# Patient Record
Sex: FEMALE | Race: BLACK OR AFRICAN AMERICAN | Employment: FULL TIME | URBAN - METROPOLITAN AREA
[De-identification: names, ages, dates, MRNs, and addresses within clinical notes are randomized per-mention and may not be internally consistent; named-entity substitution may affect disease eponyms.]

---

## 2017-02-28 ENCOUNTER — OFFICE VISIT (OUTPATIENT)
Dept: INTERNAL MEDICINE CLINIC | Age: 62
End: 2017-02-28

## 2017-02-28 VITALS
TEMPERATURE: 98.1 F | HEIGHT: 63 IN | RESPIRATION RATE: 20 BRPM | BODY MASS INDEX: 41.64 KG/M2 | WEIGHT: 235 LBS | HEART RATE: 86 BPM | DIASTOLIC BLOOD PRESSURE: 87 MMHG | SYSTOLIC BLOOD PRESSURE: 140 MMHG | OXYGEN SATURATION: 98 %

## 2017-02-28 DIAGNOSIS — J01.00 ACUTE MAXILLARY SINUSITIS, RECURRENCE NOT SPECIFIED: Primary | ICD-10-CM

## 2017-02-28 RX ORDER — CEFDINIR 300 MG/1
300 CAPSULE ORAL 2 TIMES DAILY
Qty: 20 CAP | Refills: 0 | Status: SHIPPED | OUTPATIENT
Start: 2017-02-28 | End: 2017-05-08 | Stop reason: ALTCHOICE

## 2017-02-28 RX ORDER — AZELASTINE 1 MG/ML
1 SPRAY, METERED NASAL 2 TIMES DAILY
Qty: 1 BOTTLE | Refills: 1 | Status: SHIPPED | OUTPATIENT
Start: 2017-02-28

## 2017-02-28 NOTE — PROGRESS NOTES
Reviewed record in preparation for visit and have obtained necessary documentation. Identified pt with two pt identifiers(name and ). Health Maintenance Due   Topic    Hepatitis C Screening     ZOSTER VACCINE AGE 60>     BREAST CANCER SCRN MAMMOGRAM     PAP AKA CERVICAL CYTOLOGY     FOBT Q 1 YEAR AGE 50-75     INFLUENZA AGE 9 TO ADULT          No chief complaint on file. Wt Readings from Last 3 Encounters:   17 235 lb (106.6 kg)   16 244 lb 12.8 oz (111 kg)   16 232 lb (105.2 kg)     Temp Readings from Last 3 Encounters:   17 98.1 °F (36.7 °C) (Oral)   16 99 °F (37.2 °C) (Oral)   12/15/15 98.5 °F (36.9 °C) (Oral)     BP Readings from Last 3 Encounters:   17 140/87   16 110/70   16 118/80     Pulse Readings from Last 3 Encounters:   17 86   16 80   16 86           Learning Assessment:  :     Learning Assessment 2014   PRIMARY LEARNER Patient   PRIMARY LANGUAGE ENGLISH   LEARNER PREFERENCE PRIMARY READING     LISTENING     PICTURES     VIDEOS   ANSWERED BY patient   RELATIONSHIP SELF       Depression Screening:  :     PHQ 2 / 9, over the last two weeks 3/2/2016   Little interest or pleasure in doing things Not at all   Feeling down, depressed or hopeless Not at all   Total Score PHQ 2 0       Fall Risk Assessment:  :     No flowsheet data found. Abuse Screening:  :     Abuse Screening Questionnaire 2014   Do you ever feel afraid of your partner? N   Are you in a relationship with someone who physically or mentally threatens you? N   Is it safe for you to go home?  Y       Coordination of Care Questionnaire:  :     1) Have you been to an emergency room, urgent care clinic since your last visit? no   Hospitalized since your last visit? no             2) Have you seen or consulted any other health care providers outside of [address] [place] [address] since your last visit? no  (Include any pap smears or colon screenings in this section.)    3) Do you have an Advance Directive on file? no    4) Are you interested in receiving information on Advance Directives? YES      Patient is accompanied by self I have received verbal consent from Arcelia Hernadez to discuss any/all medical information while they are present in the room.

## 2017-02-28 NOTE — PATIENT INSTRUCTIONS
Pidefarma Activation    Thank you for requesting access to Pidefarma. Please follow the instructions below to securely access and download your online medical record. Pidefarma allows you to send messages to your doctor, view your test results, renew your prescriptions, schedule appointments, and more. How Do I Sign Up? 1. In your internet browser, go to [email or web address]  2. Click on the First Time User? Click Here link in the Sign In box. You will be redirect to the New Member Sign Up page. 3. Enter your Pidefarma Access Code exactly as it appears below. You will not need to use this code after youve completed the sign-up process. If you do not sign up before the expiration date, you must request a new code. Pidefarma Access Code: [email or web address]  Expires: 2017 10:13 AM (This is the date your Pidefarma access code will )    4. Enter the last four digits of your Social Security Number (xxxx) and Date of Birth (mm/dd/yyyy) as indicated and click Submit. You will be taken to the next sign-up page. 5. Create a Pidefarma ID. This will be your Pidefarma login ID and cannot be changed, so think of one that is secure and easy to remember. 6. Create a Pidefarma password. You can change your password at any time. 7. Enter your Password Reset Question and Answer. This can be used at a later time if you forget your password. 8. Enter your e-mail address. You will receive e-mail notification when new information is available in [address] 9. Click Sign Up. You can now view and download portions of your medical record. 10. Click the Download Summary menu link to download a portable copy of your medical information. Additional Information    If you have questions, please visit the Frequently Asked Questions section of the Pidefarma website at [email or web address] Remember, Pidefarma is NOT to be used for urgent needs. For medical emergencies, dial 911.

## 2017-02-28 NOTE — PROGRESS NOTES
Subjective:   Christy Colon is a 59 y.o. female who complains of congestion, post nasal drip, dry cough, bilateral sinus pain and bilateral ear pain for several days, gradually worsening since that time. She denies a history of fevers, shortness of breath and wheezing. She reports last month she was seen at [facility] for a sinus infection. She was prescribed medication at that time. She did get better but then the symptoms return. Evaluation to date: none. Treatment to date: OTC products. Patient does not smoke cigarettes. Relevant PMH: No pertinent additional PMH. Patient Active Problem List    Diagnosis Date Noted    DVT (deep venous thrombosis) ([facility] [address] 01/26/2015    Rheumatoid arthritis 04/21/2014    HTN (hypertension) 06/18/2012    Arthritis of knee 06/18/2012    Hypercholesterolemia 06/18/2012    Obesity 06/18/2012     No Known Allergies     Review of Systems  Pertinent items are noted in HPI. Objective:     Visit Vitals    /87    Pulse 86    Temp 98.1 °F (36.7 °C) (Oral)    Resp 20    Ht 5' 3\" (1.6 m)    Wt 235 lb (106.6 kg)    SpO2 98%    BMI 41.63 kg/m2     General:  alert, cooperative, no distress   Eyes: negative   Ears: abnormal TM AD - bulging, abnormal TM AS - bulging   Sinuses: tenderness over bilateral maxillary   Mouth:  abnormal findings: mild oropharyngeal erythema   Neck: no adenopathy. Heart: normal rate and regular rhythm, no murmurs noted. Lungs: clear to auscultation bilaterally   Abdomen: Not exmained        Assessment/Plan:   sinusitis  Antibiotics per orders. RTC prn. Ludwin Millererson was seen today for cold symptoms. Diagnoses and all orders for this visit:    Acute maxillary sinusitis, recurrence not specified  -     cefdinir (OMNICEF) 300 mg capsule; Take 1 Cap by mouth two (2) times a day. -     azelastine (ASTELIN) 137 mcg (0.1 %) nasal spray; 1 [place] by Both Nostrils route two (2) times a day. Use in each nostril as directed    . patient to take medication as prescribed. She should follow up if not better.

## 2017-02-28 NOTE — MR AVS SNAPSHOT
Visit Information Date & Time Provider Department Dept. Phone Encounter #  
 2/28/2017 10:10 AM Barbara Escalante PA-C [facility] [phone] [id number] Your Appointments 3/7/2017  4:30 PM  
ROUTINE CARE with Hany Phelps MD  
[facility]) Appt Note: 6 month f/u  
 [place] [address] [facility] [address] Upcoming Health Maintenance Date Due Hepatitis C Screening 1955 ZOSTER VACCINE AGE 60> 8/4/2013 BREAST CANCER SCRN MAMMOGRAM 6/13/2014 PAP AKA CERVICAL CYTOLOGY 6/18/2015 FOBT Q 1 YEAR AGE 50-75 7/7/2015 INFLUENZA AGE 9 TO ADULT 8/1/2016 DTaP/Tdap/Td series (2 - Td) 6/18/2022 Allergies as of 2/28/2017  In Progress On: 2/28/2017 By: Shahid Streeter LPN No Known Allergies Current Immunizations  Reviewed on 7/7/2014 Name Date Influenza Vaccine 10/9/2014 TB Skin Test (PPD) Intradermal 10/9/2014 TDAP Vaccine 6/18/2012  2:07 PM  
  
 Not reviewed this visit You Were Diagnosed With   
  
 Codes Comments Acute maxillary sinusitis, recurrence not specified    -  Primary ICD-10-CM: J01.00 ICD-9-CM: 461.0 Vitals BP  
  
  
  
  
  
 140/87 Vitals History BMI and BSA Data Body Mass Index Body Surface Area  
 41.63 kg/m 2 2.18 m 2 Preferred Pharmacy Pharmacy Name Phone [facility] [address] [place] [address] [phone] Your Updated Medication List  
  
   
This list is accurate as of: 2/28/17 10:40 AM.  Always use your most recent med list.  
  
  
  
  
 azelastine 137 mcg (0.1 %) nasal spray Commonly known as:  ASTELIN  
1 Spray by Both Nostrils route two (2) times a day. Use in each nostril as directed  
  
 cefdinir 300 mg capsule Commonly known as:  OMNICEF  
 Take 1 Cap by mouth two (2) times a day. chlorthalidone 25 mg tablet Commonly known as:  Woodward Gasman Take 0.5 Tabs by mouth daily. folic acid 1 mg tablet Commonly known as:  Google Take 1 Tab by mouth daily. lisinopril 20 mg tablet Commonly known as:  PRINIVIL, ZESTRIL  
TAKE ONE TABLET BY MOUTH ONCE DAILY  
  
 methotrexate 2.5 mg tablet Commonly known as:  RHEUMATREX  
TAKE 5 TABLETS BY MOUTH EVERY   
  
 simvastatin 20 mg tablet Commonly known as:  ZOCOR Take 1 Tab by mouth nightly. SULFAZINE 500 mg tablet Generic drug:  sulfaSALAzine Prescriptions Sent to Pharmacy Refills  
 cefdinir (OMNICEF) 300 mg capsule 0 Sig: Take 1 Cap by mouth two (2) times a day. Class: Normal  
 Pharmacy: [address] [facility], [address] [facility] Ph #: [phone] Route: Oral  
 azelastine (ASTELIN) 137 mcg (0.1 %) nasal spray 1 [place] by Both Nostrils route two (2) times a day. Use in each nostril as directed Class: Normal  
 Pharmacy: [address] [facility] [address], [address] [facility] Ph #: [phone] Route: Both Nostrils Patient Instructions Deck App Technologieshart Activation Thank you for requesting access to Ajungo. Please follow the instructions below to securely access and download your online medical record. Ajungo allows you to send messages to your doctor, view your test results, renew your prescriptions, schedule appointments, and more. How Do I Sign Up? 1. In your internet browser, go to [email or web address] 
2. Click on the First Time User? Click Here link in the Sign In box. You will be redirect to the New Member Sign Up page. 3. Enter your Ajungo Access Code exactly as it appears below. You will not need to use this code after youve completed the sign-up process. If you do not sign up before the expiration date, you must request a new code. Ajungo Access Code: [email or web address] Expires: 2017 10:13 AM (This is the date your SEC Watch access code will ) 4. Enter the last four digits of your Social Security Number (xxxx) and Date of Birth (mm/dd/yyyy) as indicated and click Submit. You will be taken to the next sign-up page. 5. Create a Milk Mantrat ID. This will be your SEC Watch login ID and cannot be changed, so think of one that is secure and easy to remember. 6. Create a SEC Watch password. You can change your password at any time. 7. Enter your Password Reset Question and Answer. This can be used at a later time if you forget your password. 8. Enter your e-mail address. You will receive e-mail notification when new information is available in [address] 9. Click Sign Up. You can now view and download portions of your medical record. 10. Click the Download Summary menu link to download a portable copy of your medical information. Additional Information If you have questions, please visit the Frequently Asked Questions section of the SEC Watch website at [email or web address] "[email or web address][employer] [email or web address] Remember, SEC Watch is NOT to be used for urgent needs. For medical emergencies, dial 911. Introducing [facility] & HEALTH SERVICES! [facility] introduces SEC Watch patient portal. Now you can access parts of your medical record, email your doctor's office, and request medication refills online. 1. In your internet browser, go to [email or web address] "[email or web address][employer] [email or web address] 2. Click on the First Time User? Click Here link in the Sign In box. You will see the New Member Sign Up page. 3. Enter your SEC Watch Access Code exactly as it appears below. You will not need to use this code after youve completed the sign-up process. If you do not sign up before the expiration date, you must request a new code. · SEC Watch Access Code: [email or web address] Expires: 2017 10:13 AM 
 
4.  Enter the last four digits of your Social Security Number (xxxx) and Date of Birth (mm/dd/yyyy) as indicated and click Submit. You will be taken to the next sign-up page. 5. Create a RHLvision Technologies ID. This will be your RHLvision Technologies login ID and cannot be changed, so think of one that is secure and easy to remember. 6. Create a RHLvision Technologies password. You can change your password at any time. 7. Enter your Password Reset Question and Answer. This can be used at a later time if you forget your password. 8. Enter your e-mail address. You will receive e-mail notification when new information is available in [address] 9. Click Sign Up. You can now view and download portions of your medical record. 10. Click the Download Summary menu link to download a portable copy of your medical information. If you have questions, please visit the Frequently Asked Questions section of the RHLvision Technologies website. Remember, RHLvision Technologies is NOT to be used for urgent needs. For medical emergencies, dial 911. Now available from your iPhone and Android! Please provide this summary of care documentation to your next provider. Your primary care clinician is listed as Jessy Messina. If you have any questions after today's visit, please call [phone].

## 2017-03-07 ENCOUNTER — OFFICE VISIT (OUTPATIENT)
Dept: INTERNAL MEDICINE CLINIC | Age: 62
End: 2017-03-07

## 2017-03-07 VITALS
HEIGHT: 63 IN | RESPIRATION RATE: 16 BRPM | OXYGEN SATURATION: 98 % | SYSTOLIC BLOOD PRESSURE: 128 MMHG | WEIGHT: 238 LBS | BODY MASS INDEX: 42.17 KG/M2 | HEART RATE: 104 BPM | DIASTOLIC BLOOD PRESSURE: 84 MMHG

## 2017-03-07 DIAGNOSIS — I10 ESSENTIAL HYPERTENSION WITH GOAL BLOOD PRESSURE LESS THAN 130/80: Primary | ICD-10-CM

## 2017-03-07 DIAGNOSIS — E78.00 HYPERCHOLESTEROLEMIA: ICD-10-CM

## 2017-03-07 DIAGNOSIS — J01.00 ACUTE MAXILLARY SINUSITIS, RECURRENCE NOT SPECIFIED: ICD-10-CM

## 2017-03-07 RX ORDER — SIMVASTATIN 20 MG/1
20 TABLET, FILM COATED ORAL
Qty: 90 TAB | Refills: 3 | Status: SHIPPED | OUTPATIENT
Start: 2017-03-07 | End: 2018-03-13 | Stop reason: SDUPTHER

## 2017-03-07 RX ORDER — LISINOPRIL 20 MG/1
TABLET ORAL
Qty: 90 TAB | Refills: 3 | Status: SHIPPED | OUTPATIENT
Start: 2017-03-07 | End: 2018-03-13 | Stop reason: SDUPTHER

## 2017-03-07 NOTE — MR AVS SNAPSHOT
Visit Information Date & Time Provider Department Dept. Phone Encounter #  
 3/7/2017  4:30 PM Nancy Man, [address] [facility] [phone] [id number] Upcoming Health Maintenance Date Due Hepatitis C Screening 1955 ZOSTER VACCINE AGE 60> 8/4/2013 BREAST CANCER SCRN MAMMOGRAM 6/13/2014 PAP AKA CERVICAL CYTOLOGY 6/18/2015 FOBT Q 1 YEAR AGE 50-75 7/7/2015 INFLUENZA AGE 9 TO ADULT 8/1/2016 DTaP/Tdap/Td series (2 - Td) 6/18/2022 Allergies as of 3/7/2017  Review Complete On: 3/7/2017 By: Waqar Wood LPN No Known Allergies Current Immunizations  Reviewed on 7/7/2014 Name Date Influenza Vaccine 10/9/2014 TB Skin Test (PPD) Intradermal 10/9/2014 TDAP Vaccine 6/18/2012  2:07 PM  
  
 Not reviewed this visit You Were Diagnosed With   
  
 Codes Comments Hypercholesterolemia     ICD-10-CM: E78.00 ICD-9-CM: 272.0 Vitals BP Pulse Resp Height(growth percentile) Weight(growth percentile) SpO2  
 128/84 (!) 104 16 5' 3\" (1.6 m) 238 lb (108 kg) 98% BMI OB Status Smoking Status 42.16 kg/m2 Postmenopausal Never Smoker Vitals History BMI and BSA Data Body Mass Index Body Surface Area  
 42.16 kg/m 2 2.19 m 2 Preferred Pharmacy Pharmacy Name Phone [facility] [address] [place] [address] [phone] Your Updated Medication List  
  
   
This list is accurate as of: 3/7/17  5:14 PM.  Always use your most recent med list.  
  
  
  
  
 azelastine 137 mcg (0.1 %) nasal spray Commonly known as:  ASTELIN  
1 Spray by Both Nostrils route two (2) times a day. Use in each nostril as directed  
  
 cefdinir 300 mg capsule Commonly known as:  OMNICEF Take 1 Cap by mouth two (2) times a day. chlorthalidone 25 mg tablet Commonly known as:  Wilhemena Nickels Take 0.5 Tabs by mouth daily. folic acid 1 mg tablet Commonly known as:  Google  
 Take 1 Tab by mouth daily. lisinopril 20 mg tablet Commonly known as:  PRINIVIL, ZESTRIL  
TAKE ONE TABLET BY MOUTH ONCE DAILY  
  
 methotrexate 2.5 mg tablet Commonly known as:  RHEUMATREX  
TAKE 5 TABLETS BY MOUTH EVERY SUNDAY  
  
 simvastatin 20 mg tablet Commonly known as:  ZOCOR Take 1 Tab by mouth nightly. SULFAZINE 500 mg tablet Generic drug:  sulfaSALAzine Prescriptions Sent to Pharmacy Refills  
 lisinopril (PRINIVIL, ZESTRIL) 20 mg tablet 3 Sig: TAKE ONE TABLET BY MOUTH ONCE DAILY Class: Normal  
 Pharmacy: [address] Ph #: [phone]  
 simvastatin (ZOCOR) 20 mg tablet 3 Sig: Take 1 Tab by mouth nightly. Class: Normal  
 Pharmacy: [address] [facility], [address] [facility] Maddie Velásquez Ph #: [phone] Route: Oral  
  
Introducing [facility] & HEALTH SERVICES! Vee Mathews introduces Scorista.ru patient portal. Now you can access parts of your medical record, email your doctor's office, and request medication refills online. 1. In your internet browser, go to [email or web address] 2. Click on the First Time User? Click Here link in the Sign In box. You will see the New Member Sign Up page. 3. Enter your Scorista.ru Access Code exactly as it appears below. You will not need to use this code after youve completed the sign-up process. If you do not sign up before the expiration date, you must request a new code. · [email or web address].ru Access Code: [email or web address] Expires: 5/29/2017 10:13 AM 
 
4. Enter the last four digits of your Social Security Number (xxxx) and Date of Birth (mm/dd/yyyy) as indicated and click Submit. You will be taken to the next sign-up page. 5. Create a Cybrata Networkst ID. This will be your Scorista.ru login ID and cannot be changed, so think of one that is secure and easy to remember. 6. Create a Cybrata Networkst password. You can change your password at any time. 7. Enter your Password Reset Question and Answer. This can be used at a later time if you forget your password. 8. Enter your e-mail address. You will receive e-mail notification when new information is available in [address] 9. Click Sign Up. You can now view and download portions of your medical record. 10. Click the Download Summary menu link to download a portable copy of your medical information. If you have questions, please visit the Frequently Asked Questions section of the GameOn website. Remember, GameOn is NOT to be used for urgent needs. For medical emergencies, dial 911. Now available from your iPhone and Android! Please provide this summary of care documentation to your next provider. Your primary care clinician is listed as Charlette Tidwell. If you have any questions after today's visit, please call [phone].

## 2017-05-08 ENCOUNTER — PATIENT OUTREACH (OUTPATIENT)
Dept: INTERNAL MEDICINE CLINIC | Age: 62
End: 2017-05-08

## 2017-05-08 DIAGNOSIS — I10 ESSENTIAL HYPERTENSION WITH GOAL BLOOD PRESSURE LESS THAN 130/80: ICD-10-CM

## 2017-05-08 RX ORDER — DEXAMETHASONE 2 MG/1
4 TABLET ORAL 2 TIMES DAILY WITH MEALS
COMMUNITY
End: 2017-06-07 | Stop reason: DRUGHIGH

## 2017-05-08 RX ORDER — LEFLUNOMIDE 20 MG/1
20 TABLET ORAL DAILY
COMMUNITY

## 2017-05-08 RX ORDER — METHOTREXATE 2.5 MG/1
20 TABLET ORAL
COMMUNITY

## 2017-05-08 RX ORDER — CHLORTHALIDONE 25 MG/1
12.5 TABLET ORAL DAILY
Qty: 45 TAB | Refills: 3 | Status: SHIPPED | OUTPATIENT
Start: 2017-05-08 | End: 2018-03-13 | Stop reason: SDUPTHER

## 2017-05-08 NOTE — PROGRESS NOTES
This note will not be viewable in [address] Patient listed on [facility] inpatient census for admission to [address] [place] [address] - for c/o HA with photophobia and diagnosed with intracranial mass, most likely meningioma, on CT of head. Instructed to f/u with neurosurgeon to schedule surgery next week. Discharged on Decadron. Contacted patient for DEE DEE follow up. Introduced self and Nurse Navigator role. Verified patient's . Reports she is doing okay and denies any pain at present. Reports prior to admission she had been experiencing headaches and Friday night her pain would not subside after lying down. Reports the tumor is a little over 3 cm and when they pressed on the brain it caused swelling. She was instructed to schedule the surgery ASAP. She has placed a call to Dr. Sohail Simon office and is awaiting a return call. Reviewed all medications with patient reading from pill bottles and updated Med Rec. She requested a refill on chlorthalidone, which has been routed to Dr. Constance Cameron. She would like NN to contact her after discharge from upcoming surgery and denies any further needs at this time.

## 2017-05-30 ENCOUNTER — PATIENT OUTREACH (OUTPATIENT)
Dept: INTERNAL MEDICINE CLINIC | Age: 62
End: 2017-05-30

## 2017-05-30 NOTE — PROGRESS NOTES
Patient is listed on daily census for scheduled admission to [address] [place] [address] 5/30 for intracranial mass. Plan to follow up with patient after discharge.

## 2017-06-07 ENCOUNTER — PATIENT OUTREACH (OUTPATIENT)
Dept: INTERNAL MEDICINE CLINIC | Age: 62
End: 2017-06-07

## 2017-06-07 RX ORDER — IBUPROFEN 600 MG/1
600 TABLET ORAL EVERY 6 HOURS
COMMUNITY
End: 2019-08-20 | Stop reason: ALTCHOICE

## 2017-06-07 RX ORDER — DEXAMETHASONE 2 MG/1
2 TABLET ORAL 2 TIMES DAILY WITH MEALS
COMMUNITY
End: 2017-09-13 | Stop reason: ALTCHOICE

## 2017-06-07 RX ORDER — BUTALBITAL, ACETAMINOPHEN, CAFFEINE AND CODEINE PHOSPHATE 50; 325; 40; 30 MG/1; MG/1; MG/1; MG/1
1-2 CAPSULE ORAL
COMMUNITY
End: 2017-09-13 | Stop reason: ALTCHOICE

## 2017-06-07 NOTE — PROGRESS NOTES
This note will not be viewable in [address] Luc patient listed on daily inpatient census. She was admitted to [address] for a scheduled resection of brain meningioma by Dr. Cisco Taylor. PLAN: Discharged home. [facility]. Activities out of bed, light duty, HOB elevated, no driving. Follow up with Dr. Cisco Taylor in 2 weeks for staple removal. Restart methotrexate in one month. [facility] in 2 weeks. D/c Tylenol. NEW MEDICATIONS: Fioricet, Ibuprofen, Keppra, Decadron    Contacted patient for DEE DEE follow up. Introduced self and Nurse Navigator role. Verified patient's . She reports she is doing well. She has been gradually increasing activity and moving around slowly. She still feels a little weak. Denies any dizziness or lightheadedness. [facility] visited Saturday and told her she really does not need PT because she is getting around so well, and they have not returned. She has some incisional pain and HAs at night, but pain is controlled. She is checking incision daily and denies issues with edema, redness, etc. Reports ecchymosis which is improving. She has a lot of support. She has been checking BP and Temp daily. : 100/61, : 110/85, T 99.1, : error with BP reading, T 99, and today : 101/74, T 98.2. Advised her to notify Dr. Cisco Taylor for T >100 and she verbalized understanding. Reviewed all new medications and updated Med Rec. She is aware to restart Arava in 2 weeks and methotrexate in one month and has not been taking these. She states a plan to call Dr. Cisco Taylor today to schedule a 2 week follow up. She denies any further needs at this time. Advised her to f/u with Dr. Freedom Gallegos if any BP issues arise or other issues not related to surgery and she verbalized understanding.

## 2017-07-10 ENCOUNTER — PATIENT OUTREACH (OUTPATIENT)
Dept: INTERNAL MEDICINE CLINIC | Age: 62
End: 2017-07-10

## 2017-07-10 NOTE — PROGRESS NOTES
Patient has completed 30 day transition of care. Contacted patient for 30 day follow up. She attended follow up [facility]OR[facility] CHOWDARY appointment with Dr. Hailey Peñaloza on 6/16/17 and had staples removed and reports her incision is healing well. C/o HAs for which she is taking ibuprofen. She feels tired at times, but denies any dizziness or balance issues. She has been using her exercise bike at home. Recently filled her diuretic and feels much better since resuming this. BP running 110-120/70-80. She denies any concerns or questions at this time. She is aware of f/u appointment with Dr. Helena Alexander 9/13. She will have an MRI in September as well. Goals met. No other needs identified to Nurse Navigator at this time. Resolving episode.

## 2017-07-31 ENCOUNTER — OFFICE VISIT (OUTPATIENT)
Dept: INTERNAL MEDICINE CLINIC | Age: 62
End: 2017-07-31

## 2017-07-31 VITALS
RESPIRATION RATE: 15 BRPM | OXYGEN SATURATION: 97 % | SYSTOLIC BLOOD PRESSURE: 120 MMHG | WEIGHT: 250 LBS | HEART RATE: 88 BPM | BODY MASS INDEX: 44.3 KG/M2 | TEMPERATURE: 98.8 F | HEIGHT: 63 IN | DIASTOLIC BLOOD PRESSURE: 79 MMHG

## 2017-07-31 DIAGNOSIS — R25.2 CRAMPS OF RIGHT LOWER EXTREMITY: ICD-10-CM

## 2017-07-31 DIAGNOSIS — M79.604 PAIN OF RIGHT LOWER EXTREMITY: Primary | ICD-10-CM

## 2017-07-31 PROBLEM — D32.9 MENINGIOMA (HCC): Status: ACTIVE | Noted: 2017-07-31

## 2017-07-31 RX ORDER — TRAMADOL HYDROCHLORIDE 50 MG/1
50 TABLET ORAL
Qty: 20 TAB | Refills: 0 | Status: SHIPPED | OUTPATIENT
Start: 2017-07-31 | End: 2017-09-13 | Stop reason: ALTCHOICE

## 2017-07-31 NOTE — PROGRESS NOTES
She attended follow up [facility] appointment with Dr. Augie Lundberg on 6/16/17 and had staples removed and reports her incision is healing well. C/o HAs for which she is taking ibuprofen. She feels tired at times, but denies any dizziness or balance issues. She has been using her exercise bike at home. Patient Active Problem List    Diagnosis    Meningioma ([address]    DVT (deep venous thrombosis) ([facility] [address]     DVT post op left knee    12/ 14 6 month RX   Finish in June 2015       Rheumatoid arthritis     Good improvement  With methotrexate   20 mg seeing DR José      HTN (hypertension)    Arthritis of knee     intrarticular cartilage 11/12      Hypercholesterolemia    Obesity     Subjective:  She is now about eight weeks post resection of a meningioma done by Dr. Augie Lundberg of [facility]. She's getting ready to return to work. Started having pain in the popliteal area of her right leg only for the last couple of nights, is worse at night. She is taking a daily small dose of a diuretic. Physical Examination:  Her blood pressure is normal, she is feeling pretty well. The wound has healed nicely on her skull. Right leg is a little swollen. There is some tenderness popliteal.  ROM of the knee is okay. Plan:  1. She needs a doppler stat. She has a history of a DVT previously that was brought on by surgery. She's now eight weeks postop, so I don't think this is postsurgical.  2. I am going to get a BMP and magnesium because of the diuretic in case she is magnesium or potassium deficient. 3. Tramadol was given to take at night for the pain. Vitals:    07/31/17 1454   BP: 120/79   Pulse: 88   Resp: 15   Temp: 98.8 °F (37.1 °C)   TempSrc: Oral   SpO2: 97%   Weight: 250 lb (113.4 kg)   Height: 5' 3\" (1.6 m)     Diagnoses and all orders for this visit:    1. Pain of right lower extremity  -     DUPLEX LOWER EXT VENOUS BILAT; Future    2.  Cramps of right lower extremity  -     METABOLIC PANEL, BASIC  -     MAGNESIUM  -     traMADol (ULTRAM) 50 mg tablet; Take 1 Tab by mouth every six (6) hours as needed for Pain. Max Daily Amount: 200 mg.

## 2017-07-31 NOTE — PROGRESS NOTES
Coordination of Care Questions    1. Have you been to the ER, urgent care clinic since your last visit? No       Hospitalized since your last visit? Yes May 30th until June 2nd  For intracranial mass that was removed by Dr Rissa Grullon at [facility]      2. Have you seen or consulted any other health care providers outside of the [address] [place] [address] since your last visit? Include any pap smears or colon screening. No    Scheduled the patient for the duplex bilateral scan at [facility] as requested. Appt is 8/4/17 with an 1 pm arrival time. The patient declined an appt sooner than Friday due to returning to work tomorrow following brain surgery.

## 2017-08-01 LAB
BUN SERPL-MCNC: 12 MG/DL (ref 8–27)
BUN/CREAT SERPL: 17 (ref 12–28)
CALCIUM SERPL-MCNC: 9.6 MG/DL (ref 8.7–10.3)
CHLORIDE SERPL-SCNC: 101 MMOL/L (ref 96–106)
CO2 SERPL-SCNC: 27 MMOL/L (ref 18–29)
CREAT SERPL-MCNC: 0.71 MG/DL (ref 0.57–1)
GLUCOSE SERPL-MCNC: 101 MG/DL (ref 65–99)
MAGNESIUM SERPL-MCNC: 1.7 MG/DL (ref 1.6–2.3)
POTASSIUM SERPL-SCNC: 4 MMOL/L (ref 3.5–5.2)
SODIUM SERPL-SCNC: 142 MMOL/L (ref 134–144)

## 2017-08-07 ENCOUNTER — TELEPHONE (OUTPATIENT)
Dept: INTERNAL MEDICINE CLINIC | Age: 62
End: 2017-08-07

## 2017-08-07 NOTE — TELEPHONE ENCOUNTER
Please let the patient know her doppler was negative for DVT. It did show an area that was consistent with a Baker's Cyst of the right leg.  thanks

## 2017-08-07 NOTE — PROGRESS NOTES
Notified the patient per Dr Jovany Anthony that the potassium and magnesium levels are okay. She is pleased with the results.

## 2017-08-28 DIAGNOSIS — M79.604 PAIN OF RIGHT LOWER EXTREMITY: ICD-10-CM

## 2017-09-13 ENCOUNTER — OFFICE VISIT (OUTPATIENT)
Dept: INTERNAL MEDICINE CLINIC | Age: 62
End: 2017-09-13

## 2017-09-13 VITALS
OXYGEN SATURATION: 99 % | BODY MASS INDEX: 44.26 KG/M2 | WEIGHT: 249.8 LBS | SYSTOLIC BLOOD PRESSURE: 142 MMHG | RESPIRATION RATE: 16 BRPM | HEART RATE: 88 BPM | DIASTOLIC BLOOD PRESSURE: 90 MMHG | HEIGHT: 63 IN | TEMPERATURE: 98.2 F

## 2017-09-13 DIAGNOSIS — I10 ESSENTIAL HYPERTENSION: Primary | ICD-10-CM

## 2017-09-13 DIAGNOSIS — E78.00 HYPERCHOLESTEROLEMIA: ICD-10-CM

## 2017-09-13 NOTE — MR AVS SNAPSHOT
Visit Information Date & Time Provider Department Dept. Phone Encounter #  
 9/13/2017  4:30 PM Jose Alejandre, [address] [facility] [phone] [id number] Follow-up Instructions Return in about 6 months (around 3/13/2018). Follow-up and Disposition History Your Appointments 3/13/2018  4:30 PM  
ROUTINE CARE with Jose Alejandre MD  
[facility] [address]) Appt Note: 6 month follow up visit [place] [address] [facility] [address] [address] Upcoming Health Maintenance Date Due Hepatitis C Screening 1955 ZOSTER VACCINE AGE 60> 6/4/2013 BREAST CANCER SCRN MAMMOGRAM 6/13/2014 PAP AKA CERVICAL CYTOLOGY 6/18/2015 FOBT Q 1 YEAR AGE 50-75 7/7/2015 INFLUENZA AGE 9 TO ADULT 8/1/2017 DTaP/Tdap/Td series (2 - Td) 6/18/2022 Allergies as of 9/13/2017  Review Complete On: 9/13/2017 By: Elizabeth Carpenter No Known Allergies Current Immunizations  Reviewed on 7/7/2014 Name Date Influenza Vaccine 10/9/2014 TB Skin Test (PPD) Intradermal 10/9/2014 TDAP Vaccine 6/18/2012  2:07 PM  
  
 Not reviewed this visit You Were Diagnosed With   
  
 Codes Comments Essential hypertension    -  Primary ICD-10-CM: I10 
ICD-9-CM: 401.9 Hypercholesterolemia     ICD-10-CM: E78.00 ICD-9-CM: 272.0 Vitals BP Pulse Temp Resp Height(growth percentile) Weight(growth percentile) 142/90 88 98.2 °F (36.8 °C) (Oral) 16 5' 3\" (1.6 m) 249 lb 12.8 oz (113.3 kg) SpO2 BMI OB Status Smoking Status 99% 44.25 kg/m2 Postmenopausal Never Smoker Vitals History BMI and BSA Data Body Mass Index Body Surface Area  
 44.25 kg/m 2 2.24 m 2 Preferred Pharmacy Pharmacy Name Phone [facility] [address] [facility] [phone] Your Updated Medication List  
  
   
This list is accurate as of: 9/13/17  5:12 PM.  Always use your most recent med list.  
  
  
  
  
 ARAVA 20 mg tablet Generic drug:  leflunomide Take 20 mg by mouth daily. azelastine 137 mcg (0.1 %) nasal spray Commonly known as:  ASTELIN  
1 Spray by Both Nostrils route two (2) times a day. Use in each nostril as directed * chlorthalidone 25 mg tablet Commonly known as:  Taylor Pummel Take 0.5 Tabs by mouth daily. * chlorthalidone 25 mg tablet Commonly known as:  HYGROTEN  
TAKE ONE TABLET BY MOUTH EVERY DAY  
  
 folic acid 1 mg tablet Commonly known as:  Google Take 3 mg by mouth daily. ibuprofen 600 mg tablet Commonly known as:  MOTRIN Take 600 mg by mouth every six (6) hours. lisinopril 20 mg tablet Commonly known as:  PRINIVIL, ZESTRIL  
TAKE ONE TABLET BY MOUTH ONCE DAILY  
  
 methotrexate 2.5 mg tablet Commonly known as:  Hooppole Glance Take 20 mg by mouth every Sunday. simvastatin 20 mg tablet Commonly known as:  ZOCOR Take 1 Tab by mouth nightly. * Notice: This list has 2 medication(s) that are the same as other medications prescribed for you. Read the directions carefully, and ask your doctor or other care provider to review them with you. Follow-up Instructions Return in about 6 months (around 3/13/2018). [facility]! Flo Bass introduces [employer] patient portal. Now you can access parts of your medical record, email your doctor's office, and request medication refills online. 1. In your internet browser, go to [email or web address] 2. Click on the First Time User? Click Here link in the Sign In box. You will see the New Member Sign Up page. 3. Enter your [employer] Access Code exactly as it appears below. You will not need to use this code after youve completed the sign-up process.  If you do not sign up before the expiration date, you must request a new code. · .com Access Code: [email or web address] Expires: 12/12/2017  5:10 PM 
 
4. Enter the last four digits of your Social Security Number (xxxx) and Date of Birth (mm/dd/yyyy) as indicated and click Submit. You will be taken to the next sign-up page. 5. Create a .com ID. This will be your .com login ID and cannot be changed, so think of one that is secure and easy to remember. 6. Create a .com password. You can change your password at any time. 7. Enter your Password Reset Question and Answer. This can be used at a later time if you forget your password. 8. Enter your e-mail address. You will receive e-mail notification when new information is available in [address] 9. Click Sign Up. You can now view and download portions of your medical record. 10. Click the Download Summary menu link to download a portable copy of your medical information. If you have questions, please visit the Frequently Asked Questions section of the .com website. Remember, .com is NOT to be used for urgent needs. For medical emergencies, dial 911. Now available from your iPhone and Android! Please provide this summary of care documentation to your next provider. Your primary care clinician is listed as Javed Grey. If you have any questions after today's visit, please call [phone].

## 2017-09-13 NOTE — PROGRESS NOTES
Carole Winkler is a 57 y.o. female with the following Problems and Medications. Patient Active Problem List   Diagnosis Code    HTN (hypertension) I10    Arthritis of knee M17.10    Hypercholesterolemia E78.00    Obesity E66.9    Rheumatoid arthritis M06.9    DVT (deep venous thrombosis) ([facility]) I82.409    Meningioma ([facility]) D32.9     Current Outpatient Prescriptions   Medication Sig Dispense Refill    traMADol (ULTRAM) 50 mg tablet Take 1 Tab by mouth every six (6) hours as needed for Pain. Max Daily Amount: 200 mg. 20 Tab 0    chlorthalidone (HYGROTEN) 25 mg tablet TAKE ONE TABLET BY MOUTH EVERY DAY 90 Tab 0    ibuprofen (MOTRIN) 600 mg tablet Take 600 mg by mouth every six (6) hours.  leflunomide (ARAVA) 20 mg tablet Take 20 mg by mouth daily.  methotrexate (RHEUMATREX) 2.5 mg tablet Take 20 mg by mouth every Sunday.  chlorthalidone (HYGROTEN) 25 mg tablet Take 0.5 Tabs by mouth daily. 45 Tab 3    lisinopril (PRINIVIL, ZESTRIL) 20 mg tablet TAKE ONE TABLET BY MOUTH ONCE DAILY 90 Tab 3    simvastatin (ZOCOR) 20 mg tablet Take 1 Tab by mouth nightly. 90 Tab 3    azelastine (ASTELIN) 137 mcg (0.1 %) nasal spray 1 [place] by Both Nostrils route two (2) times a day. Use in each nostril as directed 1 Bottle 1    folic acid (FOLVITE) 1 mg tablet Take 3 mg by mouth daily.  codeine-butalbital-acetaminophen-caffeine (FIORICET WITH CODEINE) -94-30 mg per capsule Take 1-2 Caps by mouth every six (6) hours as needed for Headache.        HA less had repeat MRI this week pending results      Cardiovascular ROS: no chest pain or dyspnea on exertion  Neurological ROS: no TIA or stroke symptoms  General ROS: negative for - chills, fatigue, fever, malaise, night sweats or sleep disturbance  Endocrine ROS: negative for - hair pattern changes, hot flashes, malaise/lethargy, palpitations, polydipsia/polyuria, temperature intolerance or unexpected weight changes  Sleep ok sometimes     no apparent distress  Worried    Vitals:    09/13/17 1632 09/13/17 1658   BP: 131/87 142/90   Pulse: 88    Resp: 16    Temp: 98.2 °F (36.8 °C)    TempSrc: Oral    SpO2: 99%    Weight: 249 lb 12.8 oz (113.3 kg)    Height: 5' 3\" (1.6 m)      S1 and S2 normal, no murmurs, clicks, gallops or rubs. Regular rate and rhythm. Chest is clear; no wheezes or rales. No edema or JVD. Did not tale ace today        Hypertension borderline control  Labs 6 months      1. Essential hypertension  Borderline home readings better    2.  Hypercholesterolemia  Lab Results   Component Value Date/Time    Cholesterol, total 193 09/17/2016 11:08 AM    HDL Cholesterol 57 09/17/2016 11:08 AM    LDL, calculated 118 09/17/2016 11:08 AM    VLDL, calculated 18 09/17/2016 11:08 AM    Triglyceride 92 09/17/2016 11:08 AM    CHOL/HDL Ratio 4.6 01/27/2012 09:00 AM     On statin

## 2018-03-13 ENCOUNTER — OFFICE VISIT (OUTPATIENT)
Dept: INTERNAL MEDICINE CLINIC | Age: 63
End: 2018-03-13

## 2018-03-13 VITALS
TEMPERATURE: 98.6 F | HEIGHT: 63 IN | BODY MASS INDEX: 43.77 KG/M2 | WEIGHT: 247 LBS | HEART RATE: 90 BPM | RESPIRATION RATE: 16 BRPM | OXYGEN SATURATION: 98 % | SYSTOLIC BLOOD PRESSURE: 144 MMHG | DIASTOLIC BLOOD PRESSURE: 88 MMHG

## 2018-03-13 DIAGNOSIS — I10 ESSENTIAL HYPERTENSION WITH GOAL BLOOD PRESSURE LESS THAN 130/80: ICD-10-CM

## 2018-03-13 DIAGNOSIS — E78.00 HYPERCHOLESTEROLEMIA: ICD-10-CM

## 2018-03-13 DIAGNOSIS — J06.9 VIRAL UPPER RESPIRATORY TRACT INFECTION: Primary | ICD-10-CM

## 2018-03-13 RX ORDER — BENZONATATE 200 MG/1
200 CAPSULE ORAL
Qty: 21 CAP | Refills: 0 | Status: SHIPPED | OUTPATIENT
Start: 2018-03-13 | End: 2018-03-20

## 2018-03-13 RX ORDER — CHLORTHALIDONE 25 MG/1
TABLET ORAL
Qty: 90 TAB | Refills: 3 | Status: CANCELLED | OUTPATIENT
Start: 2018-03-13

## 2018-03-13 RX ORDER — METHOTREXATE 2.5 MG/1
20 TABLET ORAL
Status: CANCELLED | OUTPATIENT
Start: 2018-03-18

## 2018-03-13 RX ORDER — LISINOPRIL 20 MG/1
TABLET ORAL
Qty: 90 TAB | Refills: 3 | Status: SHIPPED | OUTPATIENT
Start: 2018-03-13 | End: 2019-02-20 | Stop reason: SDUPTHER

## 2018-03-13 RX ORDER — SIMVASTATIN 20 MG/1
20 TABLET, FILM COATED ORAL
Qty: 90 TAB | Refills: 3 | Status: CANCELLED | OUTPATIENT
Start: 2018-03-13

## 2018-03-13 RX ORDER — CHLORTHALIDONE 25 MG/1
25 TABLET ORAL DAILY
Qty: 90 TAB | Refills: 3 | Status: SHIPPED | OUTPATIENT
Start: 2018-03-13 | End: 2018-06-01 | Stop reason: SDUPTHER

## 2018-03-13 RX ORDER — LEFLUNOMIDE 20 MG/1
20 TABLET ORAL DAILY
Qty: 90 TAB | Refills: 3 | Status: CANCELLED | OUTPATIENT
Start: 2018-03-13

## 2018-03-13 RX ORDER — SIMVASTATIN 20 MG/1
20 TABLET, FILM COATED ORAL
Qty: 90 TAB | Refills: 3 | Status: SHIPPED | OUTPATIENT
Start: 2018-03-13 | End: 2019-02-20 | Stop reason: SDUPTHER

## 2018-03-13 NOTE — PROGRESS NOTES
Coordination of Care Questions    1. Have you been to the ER, urgent care clinic since your last visit? No       Hospitalized since your last visit? No    2. Have you seen or consulted any other health care providers outside of the [address] [place] [address] since your last visit? Include any pap smears or colon screening.  No

## 2018-03-13 NOTE — MR AVS SNAPSHOT
[address] [place] [address] [address] [place] [address] 
[phone] Patient: Mikey Goodson MRN:  [id number] Visit Information Date & Time Provider Department Dept. Phone Encounter #  
 3/13/2018  4:30 PM Georgette Corcoran, [address] [place] [address] [facility] [phone] [id number] Follow-up Instructions Return in about 6 months (around 9/13/2018). Follow-up and Disposition History Upcoming Health Maintenance Date Due Hepatitis C Screening 1955 ZOSTER VACCINE AGE 60> 6/4/2013 BREAST CANCER SCRN MAMMOGRAM 6/13/2014 PAP AKA CERVICAL CYTOLOGY 6/18/2015 FOBT Q 1 YEAR AGE 50-75 7/7/2015 Influenza Age 5 to Adult 8/1/2017 DTaP/Tdap/Td series (2 - Td) 6/18/2022 Allergies as of 3/13/2018  Review Complete On: 3/13/2018 By: Sarika Irby LPN No Known Allergies Current Immunizations  Reviewed on 7/7/2014 Name Date Influenza Vaccine 10/9/2014 TB Skin Test (PPD) Intradermal 10/9/2014 TDAP Vaccine 6/18/2012  2:07 PM  
  
 Not reviewed this visit You Were Diagnosed With   
  
 Codes Comments Viral upper respiratory tract infection    -  Primary ICD-10-CM: J06.9, B97.89 ICD-9-CM: 465.9 Essential hypertension with goal blood pressure less than 130/80     ICD-10-CM: I10 
ICD-9-CM: 401.9 Hypercholesterolemia     ICD-10-CM: E78.00 ICD-9-CM: 272.0 Class 3 obesity due to excess calories with serious comorbidity in adult, unspecified BMI     ICD-10-CM: E66.09 
ICD-9-CM: 278.00 Vitals BP Pulse Temp Resp Height(growth percentile) Weight(growth percentile) 144/88 90 98.6 °F (37 °C) (Oral) 16 5' 3\" (1.6 m) 247 lb (112 kg) SpO2 BMI OB Status Smoking Status 98% 43.75 kg/m2 Postmenopausal Never Smoker Vitals History BMI and BSA Data Body Mass Index Body Surface Area 43.75 kg/m 2 2.23 m 2 Preferred Pharmacy Pharmacy Name Phone [facility] [address], [address] [phone] Your Updated Medication List  
  
   
This list is accurate as of 3/13/18  5:09 PM.  Always use your most recent med list.  
  
  
  
  
 ARAVA 20 mg tablet Generic drug:  leflunomide Take 20 mg by mouth daily. azelastine 137 mcg (0.1 %) nasal spray Commonly known as:  ASTELIN  
1 Spray by Both Nostrils route two (2) times a day. Use in each nostril as directed  
  
 benzonatate 200 mg capsule Commonly known as:  TESSALON Take 1 Cap by mouth three (3) times daily as needed for Cough for up to 7 days. * chlorthalidone 25 mg tablet Commonly known as:  HYGROTEN  
TAKE ONE TABLET BY MOUTH EVERY DAY  
  
 * chlorthalidone 25 mg tablet Commonly known as:  Layne Tamika Take 1 Tab by mouth daily. folic acid 1 mg tablet Commonly known as:  Google Take 3 mg by mouth daily. ibuprofen 600 mg tablet Commonly known as:  MOTRIN Take 600 mg by mouth every six (6) hours. lisinopril 20 mg tablet Commonly known as:  PRINIVIL, ZESTRIL  
TAKE ONE TABLET BY MOUTH ONCE DAILY  
  
 methotrexate 2.5 mg tablet Commonly known as:  Tess Ramp Take 20 mg by mouth every Sunday. simvastatin 20 mg tablet Commonly known as:  ZOCOR Take 1 Tab by mouth nightly. * Notice: This list has 2 medication(s) that are the same as other medications prescribed for you. Read the directions carefully, and ask your doctor or other care provider to review them with you. Prescriptions Sent to Pharmacy Refills  
 chlorthalidone (HYGROTEN) 25 mg tablet 3 Sig: Take 1 Tab by mouth daily. Class: Normal  
 Pharmacy: [facility] [address], [address] Ph #: [phone] Route: Oral  
 lisinopril (PRINIVIL, ZESTRIL) 20 mg tablet 3 Sig: TAKE ONE TABLET BY MOUTH ONCE DAILY  Class: Normal  
 Pharmacy: [facility] [address] ARELY SIMMONS [address] [place] [address] Ph #: [phone]  
 simvastatin (ZOCOR) 20 mg tablet 3 Sig: Take 1 Tab by mouth nightly. Class: Normal  
 Pharmacy: [facility] [address], [address] [facility] Ph #: [phone] Route: Oral  
 benzonatate (TESSALON) 200 mg capsule 0 Sig: Take 1 Cap by mouth three (3) times daily as needed for Cough for up to 7 days. Class: Normal  
 Pharmacy: [facility] [address], [address] [facility] Ph #: [phone] Route: Oral  
  
Follow-up Instructions Return in about 6 months (around 9/13/2018). Introducing [facility]! Geno Kern introduces [facility] patient portal. Now you can access parts of your medical record, email your doctor's office, and request medication refills online. 1. In your internet browser, go to [email or web address] 2. Click on the First Time User? Click Here link in the Sign In box. You will see the New Member Sign Up page. 3. Enter your [facility] Access Code exactly as it appears below. You will not need to use this code after youve completed the sign-up process. If you do not sign up before the expiration date, you must request a new code. · [facility] Access Code: [email or web address] Expires: 6/11/2018  5:09 PM 
 
4. Enter the last four digits of your Social Security Number (xxxx) and Date of Birth (mm/dd/yyyy) as indicated and click Submit. You will be taken to the next sign-up page. 5. Create a [employer] ID. This will be your [facility] login ID and cannot be changed, so think of one that is secure and easy to remember. 6. Create a [employer] password. You can change your password at any time. 7. Enter your Password Reset Question and Answer. This can be used at a later time if you forget your password. 8. Enter your e-mail address. You will receive e-mail notification when new information is available in [address] 9. Click Sign Up. You can now view and download portions of your medical record. 10. Click the Download Summary menu link to download a portable copy of your medical information. If you have questions, please visit the Frequently Asked Questions section of the [employer] website. Remember, [employer] is NOT to be used for urgent needs. For medical emergencies, dial 911. Now available from your iPhone and Android! Please provide this summary of care documentation to your next provider. Your primary care clinician is listed as Moncho Goddard. If you have any questions after today's visit, please call [phone].

## 2018-03-13 NOTE — PROGRESS NOTES
Chief Complaint   Patient presents with    Follow-up     6 mon     Cold Symptoms     x 1 week, congestion, coughing alot at night  with yellow drainage      Korina Forde is a 57 y.o. female with the following Problems and Medications. Patient Active Problem List   Diagnosis Code    HTN (hypertension) I10    Arthritis of knee M17.10    Hypercholesterolemia E78.00    Obesity E66.9    Rheumatoid arthritis M06.9    DVT (deep venous thrombosis) ([facility]) I82.409    Meningioma ([facility]) D32.9     Current Outpatient Prescriptions   Medication Sig Dispense Refill    chlorthalidone (HYGROTEN) 25 mg tablet TAKE ONE TABLET BY MOUTH EVERY DAY 90 Tab 0    chlorthalidone (HYGROTEN) 25 mg tablet Take 0.5 Tabs by mouth daily. 45 Tab 3    lisinopril (PRINIVIL, ZESTRIL) 20 mg tablet TAKE ONE TABLET BY MOUTH ONCE DAILY 90 Tab 3    simvastatin (ZOCOR) 20 mg tablet Take 1 Tab by mouth nightly. 90 Tab 3    azelastine (ASTELIN) 137 mcg (0.1 %) nasal spray 1 [place] by Both Nostrils route two (2) times a day. Use in each nostril as directed 1 Bottle 1    ibuprofen (MOTRIN) 600 mg tablet Take 600 mg by mouth every six (6) hours.  leflunomide (ARAVA) 20 mg tablet Take 20 mg by mouth daily.  methotrexate (RHEUMATREX) 2.5 mg tablet Take 20 mg by mouth every Sunday.  folic acid (FOLVITE) 1 mg tablet Take 3 mg by mouth daily.        TILLMAN less had repeat MRI this week pending results      Cardiovascular ROS: no chest pain or dyspnea on exertion  Neurological ROS: no TIA or stroke symptoms  General ROS: negative for - chills, fatigue, fever, malaise, night sweats or sleep disturbance  Endocrine ROS: negative for - hair pattern changes, hot flashes, malaise/lethargy, palpitations, polydipsia/polyuria, temperature intolerance or unexpected weight changes  Sleep ok sometimes   Has had cold for a week with night cough better otherwise  no apparent distress    Vitals:    03/13/18 1645 03/13/18 1659   BP: 160/90 144/88   Pulse: 90 Resp: 16    Temp: 98.6 °F (37 °C)    TempSrc: Oral    SpO2: 98%    Weight: 247 lb (112 kg)    Height: 5' 3\" (1.6 m)        Vitals:    03/13/18 1645   BP: 160/90   Pulse: 90   Resp: 16   Temp: 98.6 °F (37 °C)   TempSrc: Oral   SpO2: 98%   Weight: 247 lb (112 kg)   Height: 5' 3\" (1.6 m)   no apparent distress  Paranasal sinuses are normal. No tenderness to the maxillary, frontal, ethmoids or mastoids. Transillumination of the maxillary sinuses is normal.    S1 and S2 normal, no murmurs, clicks, gallops or rubs. Regular rate and rhythm. Chest is clear; no wheezes or rales. No edema or JVD. Hypertension borderline control will increase diuretic to 25 mg  Work on weight  Low sodium diet  Labs 6 months      1. Essential hypertension with goal blood pressure less than 130/80  Increase dose  - chlorthalidone (HYGROTEN) 25 mg tablet; Take 1 Tab by mouth daily. Dispense: 90 Tab; Refill: 3    2. Hypercholesterolemia  Stable no chest pain  - simvastatin (ZOCOR) 20 mg tablet; Take 1 Tab by mouth nightly. Dispense: 90 Tab; Refill: 3    3. Viral upper respiratory tract infection  Getting better  - benzonatate (TESSALON) 200 mg capsule; Take 1 Cap by mouth three (3) times daily as needed for Cough for up to 7 days. Dispense: 21 Cap; Refill: 0    4.  Class 3 obesity due to excess calories with serious comorbidity in adult, unspecified BMI  Discusses 1 pound weight loss per week is 3500 calories per week

## 2018-06-01 ENCOUNTER — OFFICE VISIT (OUTPATIENT)
Dept: INTERNAL MEDICINE CLINIC | Age: 63
End: 2018-06-01

## 2018-06-01 VITALS
BODY MASS INDEX: 42.88 KG/M2 | DIASTOLIC BLOOD PRESSURE: 95 MMHG | OXYGEN SATURATION: 98 % | HEIGHT: 63 IN | RESPIRATION RATE: 20 BRPM | TEMPERATURE: 98.2 F | HEART RATE: 97 BPM | SYSTOLIC BLOOD PRESSURE: 154 MMHG | WEIGHT: 242 LBS

## 2018-06-01 DIAGNOSIS — J06.9 ACUTE URI: Primary | ICD-10-CM

## 2018-06-01 PROBLEM — E66.01 OBESITY, MORBID (HCC): Status: ACTIVE | Noted: 2018-06-01

## 2018-06-01 RX ORDER — CODEINE PHOSPHATE AND GUAIFENESIN 10; 100 MG/5ML; MG/5ML
5 SOLUTION ORAL
Qty: 100 ML | Refills: 0 | Status: SHIPPED | OUTPATIENT
Start: 2018-06-01 | End: 2019-02-20 | Stop reason: ALTCHOICE

## 2018-06-01 RX ORDER — AZELASTINE 1 MG/ML
1 SPRAY, METERED NASAL 2 TIMES DAILY
Qty: 1 BOTTLE | Refills: 1 | Status: SHIPPED | OUTPATIENT
Start: 2018-06-01 | End: 2019-02-20 | Stop reason: SDUPTHER

## 2018-06-01 RX ORDER — MELATONIN
2000 DAILY
COMMUNITY

## 2018-06-01 RX ORDER — CEFDINIR 300 MG/1
300 CAPSULE ORAL 2 TIMES DAILY
Qty: 20 CAP | Refills: 0 | Status: SHIPPED | OUTPATIENT
Start: 2018-06-01 | End: 2019-02-20 | Stop reason: ALTCHOICE

## 2018-06-01 NOTE — PROGRESS NOTES
Subjective:   Curt Roy is a 57 y.o. female who complains of congestion, post nasal drip, productive cough, bilateral sinus pain and fatigue for 5+ days, gradually worsening since that time. She denies a history of shortness of breath and wheezing. Evaluation to date: none. Treatment to date: OTC products. Patient does not smoke cigarettes. Relevant PMH: No pertinent additional PMH. Patient Active Problem List    Diagnosis Date Noted    Obesity, morbid ([address] 06/01/2018    Mening[facility]oma ([facility] 07/31/2017    [facility]) ([facility]) 01/26/2015    Rheumatoid arthritis 04/21/2014    HTN (hypertension) 06/18/2012    Arthritis of knee 06/18/2012    Hypercholesterolemia 06/18/2012    Obesity 06/18/2012     No Known Allergies     Review of Systems  Pertinent items are noted in HPI. Objective:     Visit Vitals    BP (!) 154/95    Pulse 97    Temp 98.2 °F (36.8 °C) (Oral)    Resp 20    Ht 5' 3\" (1.6 m)    Wt 242 lb (109.8 kg)    SpO2 98%    BMI 42.87 kg/m2     General:  alert, cooperative, no distress   Eyes: negative   Ears: abnormal TM AD - bulging, abnormal TM AS - bulging   Sinuses: Normal paranasal sinuses without tenderness   Mouth:  abnormal findings: mild oropharyngeal erythema and mucous present retropharynx   Neck: no adenopathy. Heart: normal rate and regular rhythm, no murmurs noted. Lungs: clear to auscultation bilaterally   Abdomen: Not examined        Assessment/Plan:   upper respiratory illness  Nasal saline sprays for congestion. Antibiotics per orders. RTC prn. Diagnoses and all orders for this visit:    1. Acute URI  -     cefdinir (OMNICEF) 300 mg capsule; Take 1 Cap by mouth two (2) times a day. -     guaiFENesin-codeine (CHERATUSSIN AC) 100-10 mg/5 mL solution; Take 5 mL by mouth three (3) times daily as needed for Cough. Max Daily Amount: 15 mL.     Other orders  -     azelastine (ASTELIN) 137 mcg (0.1 %) nasal spray; 1 [place] by Both Nostrils route two (2) times a day. Use in each nostril as directed    . take medication as prescribed. Follow up if not getting better. She understands she should not drive or operate machinery while taking cough medication. All this discussed with the patient and she understands and agrees.

## 2018-06-01 NOTE — LETTER
NOTIFICATION RETURN TO WORK / SCHOOL 
 
6/1/2018 8:48 AM 
 
Ms. Emile Mccarthy 
[address] [place] [phone] To Whom It May Concern: 
 
Emiel Mccarthy is currently under the care of Mahesh Buchanan.. She will return to work/school on: June 4, 2018 If there are questions or concerns please have the patient contact our office.  
 
 
 
Sincerely, 
 
 
Jorden Torres PA-C

## 2018-06-01 NOTE — MR AVS SNAPSHOT
[address] [place] [address] [address] [facility] 
[phone] Patient: Marco A Aguilera MRN:  [id number] Visit Information Date & Time Provider Department Dept. Phone Encounter #  
 6/1/2018  8:30 AM Sami García PA-C [facility] [phone] [id number] Your Appointments 9/12/2018  4:30 PM  
ROUTINE CARE with Bina Steward MD  
[facility]: R F/U  
 [place] [address] [place] [address] [facility] [address]  
[facility] [address] [facility] [address] [facility] 7 [address] Upcoming Health Maintenance Date Due Hepatitis C Screening 1955 ZOSTER VACCINE AGE 60> 6/4/2013 BREAST CANCER SCRN MAMMOGRAM 6/13/2014 PAP AKA CERVICAL CYTOLOGY 6/18/2015 FOBT Q 1 YEAR AGE 50-75 7/7/2015 Bone Densitometry (Dexa) Screening 8/4/2018 Influenza Age 5 to Adult 8/1/2018 DTaP/Tdap/Td series (2 - Td) 6/18/2022 Allergies as of 6/1/2018  In Progress On: 6/1/2018 By: Gisele Ga LPN No Known Allergies Current Immunizations  Reviewed on 7/7/2014 Name Date Influenza Vaccine 10/9/2014 TB Skin Test (PPD) Intradermal 10/9/2014 TDAP Vaccine 6/18/2012  2:07 PM  
  
 Not reviewed this visit You Were Diagnosed With   
  
 Codes Comments Acute URI    -  Primary ICD-10-CM: J06.9 ICD-9-CM: 465.9 Vitals BP Pulse Temp Resp Height(growth percentile) Weight(growth percentile) (!) 154/95 97 98.2 °F (36.8 °C) (Oral) 20 5' 3\" (1.6 m) 242 lb (109.8 kg) SpO2 BMI OB Status Smoking Status 98% 42.87 kg/m2 Postmenopausal Never Smoker Vitals History BMI and BSA Data Body Mass Index Body Surface Area  
 42.87 kg/m 2 2.21 m 2 Preferred Pharm[facility] [address] [phone] Your Updated Medication List  
  
   
This list is accurate as of 6/1/18  8:49 AM.  Always use your most recent med list.  
  
  
  
  
 ARAVA 20 mg tablet Generic drug:  leflunomide Take 20 mg by mouth daily. azelastine 137 mcg (0.1 %) nasal spray Commonly known as:  ASTELIN  
1 Spray by Both Nostrils route two (2) times a day. Use in each nostril as directed  
  
 cefdinir 300 mg capsule Commonly known as:  OMNICEF Take 1 Cap by mouth two (2) times a day. chlorthalidone 25 mg tablet Commonly known as:  HYGROTEN  
TAKE ONE TABLET BY MOUTH EVERY DAY  
  
 folic acid 1 mg tablet Commonly known as:  Google Take 3 mg by mouth daily. guaiFENesin-codeine 100-10 mg/5 mL solution Commonly known as:  Judith Sayer Take 5 mL by mouth three (3) times daily as needed for Cough. Max Daily Amount: 15 mL. ibuprofen 600 mg tablet Commonly known as:  MOTRIN Take 600 mg by mouth every six (6) hours. lisinopril 20 mg tablet Commonly known as:  PRINIVIL, ZESTRIL  
TAKE ONE TABLET BY MOUTH ONCE DAILY  
  
 methotrexate 2.5 mg tablet Commonly known as:  Terris Madison Take 20 mg by mouth every Sunday. simvastatin 20 mg tablet Commonly known as:  ZOCOR Take 1 Tab by mouth nightly. VITAMIN D3 1,000 unit tablet Generic drug:  cholecalciferol Take 2,000 Units by mouth daily. Prescriptions Printed Refills  
 guaiFENesin-codeine (CHERATUSSIN AC) 100-10 mg/5 mL solution 0 Sig: Take 5 mL by mouth three (3) times daily as needed for Cough. Max Daily Amount: 15 mL. Class: Print Route: Oral  
  
Prescriptions Sent to Pharmacy Refills  
 cefdinir (OMNICEF) 300 mg capsule 0 Sig: Take 1 Cap by mouth two (2) times a day. Class: Normal  
 Pharmacy: [facility] [address] [facility] #: [phone] Route: Oral  
  
Introducing [facility]! [facility] introduces [employer] patient portal. Now you can access parts of your medical record, email your doctor's office, and request medication refills online. 1. In your internet browser, go to [email or web address] 2. Click on the First Time User? Click Here link in the Sign In box. You will see the New Member Sign Up page. 3. Enter your [employer] Access Code exactly as it appears below. You will not need to use this code after youve completed the sign-up process. If you do not sign up before the expiration date, you must request a new code. · [employer] Access Code: [email or web address] Expires: 6/11/2018  5:09 PM 
 
4. Enter the last four digits of your Social Security Number (xxxx) and Date of Birth (mm/dd/yyyy) as indicated and click Submit. You will be taken to the next sign-up page. 5. Create a [employer] ID. This will be your [employer] login ID and cannot be changed, so think of one that is secure and easy to remember. 6. Create a [employer] password. You can change your password at any time. 7. Enter your Password Reset Question and Answer. This can be used at a later time if you forget your password. 8. Enter your e-mail address. You will receive e-mail notification when new information is available in [address] 9. Click Sign Up. You can now view and download portions of your medical record. 10. Click the Download Summary menu link to download a portable copy of your medical information. If you have questions, please visit the Frequently Asked Questions section of the [employer] website. Remember, [employer] is NOT to be used for urgent needs. For medical emergencies, dial 911. Now available from your iPhone and Android! Please provide this summary of care documentation to your next provider. Your primary care clinician is listed as Frank Segovia. If you have any questions after today's visit, please call [phone].

## 2019-02-20 ENCOUNTER — OFFICE VISIT (OUTPATIENT)
Dept: INTERNAL MEDICINE CLINIC | Age: 64
End: 2019-02-20

## 2019-02-20 VITALS
DIASTOLIC BLOOD PRESSURE: 80 MMHG | SYSTOLIC BLOOD PRESSURE: 132 MMHG | TEMPERATURE: 98.1 F | BODY MASS INDEX: 44.83 KG/M2 | HEART RATE: 92 BPM | HEIGHT: 63 IN | WEIGHT: 253 LBS | RESPIRATION RATE: 18 BRPM | OXYGEN SATURATION: 96 %

## 2019-02-20 DIAGNOSIS — Z23 ENCOUNTER FOR IMMUNIZATION: ICD-10-CM

## 2019-02-20 DIAGNOSIS — E78.00 HYPERCHOLESTEROLEMIA: ICD-10-CM

## 2019-02-20 DIAGNOSIS — I10 ESSENTIAL HYPERTENSION: Primary | ICD-10-CM

## 2019-02-20 RX ORDER — SIMVASTATIN 20 MG/1
20 TABLET, FILM COATED ORAL
Qty: 90 TAB | Refills: 3 | Status: SHIPPED | OUTPATIENT
Start: 2019-02-20 | End: 2019-02-25

## 2019-02-20 RX ORDER — CHLORTHALIDONE 25 MG/1
TABLET ORAL
Qty: 90 TAB | Refills: 0 | Status: SHIPPED | OUTPATIENT
Start: 2019-02-20 | End: 2021-03-31 | Stop reason: SDUPTHER

## 2019-02-20 RX ORDER — LISINOPRIL 20 MG/1
TABLET ORAL
Qty: 90 TAB | Refills: 3 | Status: SHIPPED | OUTPATIENT
Start: 2019-02-20 | End: 2019-08-20

## 2019-02-20 NOTE — PROGRESS NOTES
Chief Complaint Patient presents with  Hypertension  Arthritis  Cholesterol Problem  Other HX of DVT Chief Complaint Patient presents with  Hypertension  Arthritis  Cholesterol Problem  Other HX of DVT Chan Bolton is a 70 y.o. female with the following Problems and Medications. Patient Active Problem List  
Diagnosis Code  
 HTN (hypertension) I10  
 Arthritis of knee M17.10  Hypercholesterolemia E78.00  Obesity E66.9  Rheumatoid arthritis M06.9  DVT (deep venous thrombosis) ([facility]) I82.409  Meningioma ([facility] D32.9  [facility], [facility] E66.01  
 
Current Outpatient Medications Medication Sig Dispense Refill  cholecalciferol (VITAMIN D3) 1,000 unit tablet Take 2,000 Units by mouth daily.  lisinopril (PRINIVIL, ZESTRIL) 20 mg tablet TAKE ONE TABLET BY MOUTH ONCE DAILY 90 Tab 3  
 simvastatin (ZOCOR) 20 mg tablet Take 1 Tab by mouth nightly. 90 Tab 3  chlorthalidone (HYGROTEN) 25 mg tablet TAKE ONE TABLET BY MOUTH EVERY DAY 90 Tab 0  ibuprofen (MOTRIN) 600 mg tablet Take 600 mg by mouth every six (6) hours.  leflunomide (ARAVA) 20 mg tablet Take 20 mg by mouth daily.  methotrexate (RHEUMATREX) 2.5 mg tablet Take 20 mg by mouth every Sunday.  azelastine (ASTELIN) 137 mcg (0.1 %) nasal spray 1 [place] by Both Nostrils route two (2) times a day. Use in each nostril as directed 1 Bottle 1  
 folic acid (FOLVITE) 1 mg tablet Take 3 mg by mouth daily. Cardiovascular ROS: no chest pain or dyspnea on exertion Neurological ROS: no TIA or stroke symptoms General ROS: negative for - chills, fatigue, fever, malaise, night sweats or sleep disturbance Endocrine ROS: negative for - hair pattern changes, hot flashes, malaise/lethargy, palpitations, polydipsia/polyuria, temperature intolerance or unexpected weight changes Sleep ok sometimes Has had cold for a week  Better now 
no apparent distress Vitals: 02/20/19 6828 BP: (!) 157/92 Pulse: 92 Resp: 18 Temp: 98.1 °F (36.7 °C) TempSrc: Oral  
SpO2: 96% Weight: 253 lb (114.8 kg) Height: 5' 3\" (1.6 m) Vitals:  
 02/20/19 0952 02/20/19 1011 BP: (!) 157/92 132/80 Pulse: 92 Resp: 18 Temp: 98.1 °F (36.7 °C) TempSrc: Oral   
SpO2: 96% Weight: 253 lb (114.8 kg) Height: 5' 3\" (1.6 m)   
 
 
no apparent distress Paranasal sinuses are normal. No tenderness to the maxillary, frontal, ethmoids or mastoids. Transillumination of the maxillary sinuses is normal. 
 
S1 and S2 normal, no murmurs, clicks, gallops or rubs. Regular rate and rhythm. Chest is clear; no wheezes or rales. No edema or JVD. Hypertension controlled for age based on guidelines Work on [employer] Low sodium diet Labs now Flu vaccine now as immunosupressed Diagnoses and all orders for this visit: 1. Essential hypertension 2. Hypercholesterolemia -     METABOLIC PANEL, COMPREHENSIVE 
-     LIPID PANEL 
-     simvastatin (ZOCOR) 20 mg tablet; Take 1 Tab by mouth nightly. 3. Encounter for immunization 
-     INFLUENZA VACCINE INACTIVATED (IIV), SUBUNIT, ADJUVANTED, IM Other orders -     pneumococcal 13 maximo conj dip (PREVNAR-13) 0.5 mL syrg injection; 0.5 mL by IntraMUSCular route once for 1 dose. 
-     varicella-zoster (SHINGRIX) injection; 1 Each by IntraMUSCular route once for 1 dose. -     lisinopril (PRINIVIL, ZESTRIL) 20 mg tablet; TAKE ONE TABLET BY MOUTH ONCE DAILY 
-     chlorthalidone (HYGROTEN) 25 mg tablet; TAKE ONE TABLET BY MOUTH EVERY DAY Prolonged visit with 15 minutes of time out  of more than a  25 minute visit spent counseling patient and family and formulation of care and doing all the pain medication regulatory requirements

## 2019-02-20 NOTE — PROGRESS NOTES
1. Have you been to the ER, urgent care clinic since your last visit? Hospitalized since your last visit? No 
 
2. Have you seen or consulted any other health care providers outside of the [address] [place] [address] since your last visit? Include any pap smears or colon screening.  No

## 2019-02-21 LAB
ALBUMIN SERPL-MCNC: 4.4 G/DL (ref 3.6–4.8)
ALBUMIN/GLOB SERPL: 1.6 {RATIO} (ref 1.2–2.2)
ALP SERPL-CCNC: 126 IU/L (ref 39–117)
ALT SERPL-CCNC: 9 IU/L (ref 0–32)
AST SERPL-CCNC: 40 IU/L (ref 0–40)
BILIRUB SERPL-MCNC: 0.5 MG/DL (ref 0–1.2)
BUN SERPL-MCNC: 11 MG/DL (ref 8–27)
BUN/CREAT SERPL: 11 (ref 12–28)
CALCIUM SERPL-MCNC: 9.6 MG/DL (ref 8.7–10.3)
CHLORIDE SERPL-SCNC: 100 MMOL/L (ref 96–106)
CHOLEST SERPL-MCNC: 223 MG/DL (ref 100–199)
CO2 SERPL-SCNC: 26 MMOL/L (ref 20–29)
CREAT SERPL-MCNC: 0.96 MG/DL (ref 0.57–1)
GLOBULIN SER CALC-MCNC: 2.8 G/DL (ref 1.5–4.5)
GLUCOSE SERPL-MCNC: 95 MG/DL (ref 65–99)
HDLC SERPL-MCNC: 62 MG/DL
INTERPRETATION, 910389: NORMAL
LDLC SERPL CALC-MCNC: 116 MG/DL (ref 0–99)
POTASSIUM SERPL-SCNC: 4.3 MMOL/L (ref 3.5–5.2)
PROT SERPL-MCNC: 7.2 G/DL (ref 6–8.5)
SODIUM SERPL-SCNC: 140 MMOL/L (ref 134–144)
TRIGL SERPL-MCNC: 224 MG/DL (ref 0–149)
VLDLC SERPL CALC-MCNC: 45 MG/DL (ref 5–40)

## 2019-02-25 DIAGNOSIS — E78.00 HYPERCHOLESTEROLEMIA: ICD-10-CM

## 2019-02-25 RX ORDER — SIMVASTATIN 40 MG/1
40 TABLET, FILM COATED ORAL
Qty: 90 TAB | Refills: 3 | Status: SHIPPED | OUTPATIENT
Start: 2019-02-25 | End: 2021-03-24 | Stop reason: SDUPTHER

## 2019-02-26 NOTE — PROGRESS NOTES
Left message for patient to call back to Cisco Alexander that stronger dose of simvastatin  Up to 40 mg per day as labs too high

## 2019-08-20 ENCOUNTER — OFFICE VISIT (OUTPATIENT)
Dept: INTERNAL MEDICINE CLINIC | Age: 64
End: 2019-08-20

## 2019-08-20 VITALS
HEIGHT: 63 IN | OXYGEN SATURATION: 97 % | SYSTOLIC BLOOD PRESSURE: 160 MMHG | TEMPERATURE: 96.8 F | DIASTOLIC BLOOD PRESSURE: 96 MMHG | RESPIRATION RATE: 18 BRPM | WEIGHT: 251 LBS | HEART RATE: 84 BPM | BODY MASS INDEX: 44.47 KG/M2

## 2019-08-20 DIAGNOSIS — R53.83 FATIGUE, UNSPECIFIED TYPE: Primary | ICD-10-CM

## 2019-08-20 DIAGNOSIS — I10 HYPERTENSION, POOR CONTROL: ICD-10-CM

## 2019-08-20 RX ORDER — LISINOPRIL 40 MG/1
TABLET ORAL
Qty: 90 TAB | Refills: 1 | Status: SHIPPED | OUTPATIENT
Start: 2019-08-20 | End: 2020-12-31 | Stop reason: ALTCHOICE

## 2019-08-20 NOTE — PROGRESS NOTES
Margaret Jeff is a 75 y.o. female with the following Problems and Medications. Patient Active Problem List   Diagnosis Code    HTN (hypertension) I10    Arthritis of knee M17.10    Hypercholesterolemia E78.00    Obesity E66.9    Rheumatoid arthritis M06.9    DVT (deep venous thrombosis) ([facility]) I82.409    Meningioma ([facility]) D32.9    Obesity, morbid ([facility]) E66.01     Current Outpatient Medications   Medication Sig Dispense Refill    pneumococcal 13 maximo conj dip (PREVNAR-13) 0.5 mL syrg injection 0.5 mL by IntraMUSCular route once for 1 dose. Indications: prevention of Streptococcus pneumoniae infection 0.5 mL 0    lisinopril (PRINIVIL, ZESTRIL) 40 mg tablet TAKE ONE TABLET BY MOUTH ONCE DAILY 90 Tab 1    simvastatin (ZOCOR) 40 mg tablet Take 1 Tab by mouth nightly. 90 Tab 3    chlorthalidone (HYGROTEN) 25 mg tablet TAKE ONE TABLET BY MOUTH EVERY DAY 90 Tab 0    cholecalciferol (VITAMIN D3) 1,000 unit tablet Take 2,000 Units by mouth daily.  leflunomide (ARAVA) 20 mg tablet Take 20 mg by mouth daily.  methotrexate (RHEUMATREX) 2.5 mg tablet Take 20 mg by mouth every Sunday.  azelastine (ASTELIN) 137 mcg (0.1 %) nasal spray 1 [place] by Both Nostrils route two (2) times a day. Use in each nostril as directed 1 Bottle 1    folic acid (FOLVITE) 1 mg tablet Take 3 mg by mouth daily.              Cardiovascular ROS: no chest pain or dyspnea on exertion  Neurological ROS: no TIA or stroke symptoms  General ROS: negative for - chills, fatigue, fever, malaise, night sweats or sleep disturbance  Endocrine ROS: negative for - hair pattern changes, hot flashes, malaise/lethargy, palpitations, polydipsia/polyuria, temperature intolerance or unexpected weight changes  Sleep ok sometimes   Often poor  Low energy feels tired all day    no apparent distress    Vitals:    08/20/19 0951   BP: (!) 160/96   Pulse: 84   Resp: 18   Temp: 96.8 °F (36 °C)   TempSrc: Oral   SpO2: 97%   Weight: 251 lb (113.9 kg) Height: 5' 3\" (1.6 m)     Vitals:    08/20/19 0951   BP: (!) 160/96   Pulse: 84   Resp: 18   Temp: 96.8 °F (36 °C)   TempSrc: Oral   SpO2: 97%   Weight: 251 lb (113.9 kg)   Height: 5' 3\" (1.6 m)       no apparent distress  Paranasal sinuses are normal. No tenderness to the maxillary, frontal, ethmoids or mastoids. Transillumination of the maxillary sinuses is normal.    S1 and S2 normal, no murmurs, clicks, gallops or rubs. Regular rate and rhythm. Chest is clear; no wheezes or rales. No edema or JVD. Hypertension controlled for age based on guidelines    Work on weight  Low sodium diet  Increase dose lisinopril  Flu vaccine now as immunosupressed        Diagnoses and all orders for this visit:    1. Fatigue, unspecified type  -     REFERRAL TO SLEEP STUDIES    2. Hypertension, poor control  -     REFERRAL TO SLEEP STUDIES    Other orders  -     pneumococcal 13 maximo conj dip (PREVNAR-13) 0.5 mL syrg injection; 0.5 mL by IntraMUSCular route once for 1 dose.  Indications: prevention of Streptococcus pneumoniae infection  -     lisinopril (PRINIVIL, ZESTRIL) 40 mg tablet; TAKE ONE TABLET BY MOUTH ONCE DAILY

## 2019-08-20 NOTE — PROGRESS NOTES
1. Have you been to the ER, urgent care clinic since your last visit? Hospitalized since your last visit? No    2. Have you seen or consulted any other health care providers outside of the [address] [place] [address] since your last visit? Include any pap smears or colon screening.  Rheumatology-NATASHA

## 2020-02-05 LAB — CREATININE, EXTERNAL: 1.14

## 2020-02-20 ENCOUNTER — OFFICE VISIT (OUTPATIENT)
Dept: INTERNAL MEDICINE CLINIC | Age: 65
End: 2020-02-20

## 2020-02-20 VITALS
TEMPERATURE: 97.9 F | WEIGHT: 253 LBS | DIASTOLIC BLOOD PRESSURE: 82 MMHG | OXYGEN SATURATION: 92 % | HEIGHT: 63 IN | BODY MASS INDEX: 44.83 KG/M2 | HEART RATE: 92 BPM | SYSTOLIC BLOOD PRESSURE: 142 MMHG | RESPIRATION RATE: 18 BRPM

## 2020-02-20 DIAGNOSIS — R53.83 FATIGUE, UNSPECIFIED TYPE: Primary | ICD-10-CM

## 2020-02-20 DIAGNOSIS — T17.308A CHOKING, INITIAL ENCOUNTER: ICD-10-CM

## 2020-02-20 DIAGNOSIS — E78.00 HYPERCHOLESTEROLEMIA: ICD-10-CM

## 2020-02-20 DIAGNOSIS — I10 ESSENTIAL HYPERTENSION: ICD-10-CM

## 2020-02-20 DIAGNOSIS — R51.9 MORNING HEADACHE: ICD-10-CM

## 2020-02-20 RX ORDER — ACETAMINOPHEN AND CODEINE PHOSPHATE 300; 30 MG/1; MG/1
TABLET ORAL
COMMUNITY
Start: 2020-02-11 | End: 2021-03-31 | Stop reason: ALTCHOICE

## 2020-02-20 RX ORDER — AMOXICILLIN 500 MG/1
CAPSULE ORAL
COMMUNITY
Start: 2020-02-11 | End: 2021-03-31 | Stop reason: ALTCHOICE

## 2020-02-20 NOTE — PROGRESS NOTES
1. Have you been to the ER, urgent care clinic since your last visit? Hospitalized since your last visit? No    2. Have you seen or consulted any other health care providers outside of the [address] [place] [address] since your last visit? Include any pap smears or colon screening.  No

## 2020-02-21 NOTE — PROGRESS NOTES
Caren Addison is a 75 y.o. female with the following Problems and Medications. Patient Active Problem List   Diagnosis Code    HTN (hypertension) I10    Arthritis of knee M17.10    Hypercholesterolemia E78.00    Obesity E66.9    Rheumatoid arthritis M06.9    DVT (deep venous thrombosis) ([facility]) I82.409    Meningioma ([facility]) D32.9    Obesity, morbid ([facility]) E66.01     Current Outpatient Medications   Medication Sig Dispense Refill    lisinopril (PRINIVIL, ZESTRIL) 40 mg tablet TAKE ONE TABLET BY MOUTH ONCE DAILY 90 Tab 1    simvastatin (ZOCOR) 40 mg tablet Take 1 Tab by mouth nightly. 90 Tab 3    chlorthalidone (HYGROTEN) 25 mg tablet TAKE ONE TABLET BY MOUTH EVERY DAY 90 Tab 0    cholecalciferol (VITAMIN D3) 1,000 unit tablet Take 2,000 Units by mouth daily.  leflunomide (ARAVA) 20 mg tablet Take 20 mg by mouth daily.  methotrexate (RHEUMATREX) 2.5 mg tablet Take 20 mg by mouth every Sunday.  azelastine (ASTELIN) 137 mcg (0.1 %) nasal spray 1 [place] by Both Nostrils route two (2) times a day. Use in each nostril as directed 1 Bottle 1    folic acid (FOLVITE) 1 mg tablet Take 3 mg by mouth daily.       amoxicillin (AMOXIL) 500 mg capsule       acetaminophen-codeine (TYLENOL #3) 300-30 mg per tablet              Cardiovascular ROS: no chest pain or dyspnea on exertion  Neurological ROS: no TIA or stroke symptoms  General ROS: negative for - chills, fatigue, fever, malaise, night sweats or sleep disturbance  Endocrine ROS: negative for - hair pattern changes, hot flashes, malaise/lethargy, palpitations, polydipsia/polyuria, temperature intolerance or unexpected weight changes  Sleep ok sometimes   Often poor  Low energy feels tired all day  Never did sleep eval    no apparent distress    Vitals:    02/20/20 0945 02/20/20 1029   BP: 145/87 142/82   Pulse: 92    Resp: 18    Temp: 97.9 °F (36.6 °C)    TempSrc: Oral    SpO2: 92%    Weight: 253 lb (114.8 kg)    Height: 5' 3\" (1.6 m) Vitals:    02/20/20 0945 02/20/20 1029   BP: 145/87 142/82   Pulse: 92    Resp: 18    Temp: 97.9 °F (36.6 °C)    TempSrc: Oral    SpO2: 92%    Weight: 253 lb (114.8 kg)    Height: 5' 3\" (1.6 m)        no apparent distress  Paranasal sinuses are normal. No tenderness to the maxillary, frontal, ethmoids or mastoids. Transillumination of the maxillary sinuses is normal.    S1 and S2 normal, no murmurs, clicks, gallops or rubs. Regular rate and rhythm. Chest is clear; no wheezes or rales. No edema or JVD. Hypertension faircontrolled for age based on guidelines    Work on weight  Low sodium diet  Increase dose lisinopril  Flu vaccine now as immunosupressed        Diagnoses and all orders for this visit:    1. Fatigue, unspecified type  -     REFERRAL TO SLEEP STUDIES    2. Choking, initial encounter  -     REFERRAL TO SLEEP STUDIES    3. Morning headache  -     REFERRAL TO SLEEP STUDIES    4. Essential hypertension  -     LIPID PANEL; Future  -     METABOLIC PANEL, COMPREHENSIVE; Future  -     LIPID PANEL; Future  -     METABOLIC PANEL, COMPREHENSIVE; Future    5. Hypercholesterolemia  -     LIPID PANEL; Future  -     METABOLIC PANEL, COMPREHENSIVE; Future  -     LIPID PANEL; Future  -     METABOLIC PANEL, COMPREHENSIVE;  Future        Prolonged visit with 15 minutes of time out  of more than a  25 minute visit spent counseling patient and family and formulation of care

## 2020-02-23 LAB
ALBUMIN SERPL-MCNC: 4.1 G/DL (ref 3.8–4.8)
ALBUMIN/GLOB SERPL: 1.4 {RATIO} (ref 1.2–2.2)
ALP SERPL-CCNC: 96 IU/L (ref 39–117)
ALT SERPL-CCNC: 20 IU/L (ref 0–32)
AST SERPL-CCNC: 35 IU/L (ref 0–40)
BILIRUB SERPL-MCNC: 0.3 MG/DL (ref 0–1.2)
BUN SERPL-MCNC: 8 MG/DL (ref 8–27)
BUN/CREAT SERPL: 9 (ref 12–28)
CALCIUM SERPL-MCNC: 9.2 MG/DL (ref 8.7–10.3)
CHLORIDE SERPL-SCNC: 103 MMOL/L (ref 96–106)
CHOLEST SERPL-MCNC: 229 MG/DL (ref 100–199)
CO2 SERPL-SCNC: 25 MMOL/L (ref 20–29)
CREAT SERPL-MCNC: 0.93 MG/DL (ref 0.57–1)
GLOBULIN SER CALC-MCNC: 3 G/DL (ref 1.5–4.5)
GLUCOSE SERPL-MCNC: 97 MG/DL (ref 65–99)
HDLC SERPL-MCNC: 57 MG/DL
INTERPRETATION, 910389: NORMAL
LDLC SERPL CALC-MCNC: 144 MG/DL (ref 0–99)
POTASSIUM SERPL-SCNC: 4.2 MMOL/L (ref 3.5–5.2)
PROT SERPL-MCNC: 7.1 G/DL (ref 6–8.5)
SODIUM SERPL-SCNC: 143 MMOL/L (ref 134–144)
TRIGL SERPL-MCNC: 142 MG/DL (ref 0–149)
VLDLC SERPL CALC-MCNC: 28 MG/DL (ref 5–40)

## 2020-04-06 RX ORDER — LISINOPRIL 20 MG/1
TABLET ORAL
Qty: 90 TAB | Refills: 0 | Status: SHIPPED | OUTPATIENT
Start: 2020-04-06 | End: 2020-07-03

## 2020-04-28 DIAGNOSIS — E78.00 HYPERCHOLESTEROLEMIA: ICD-10-CM

## 2020-04-28 RX ORDER — SIMVASTATIN 20 MG/1
TABLET, FILM COATED ORAL
Qty: 90 TAB | Refills: 0 | OUTPATIENT
Start: 2020-04-28

## 2020-11-16 DIAGNOSIS — E78.00 HYPERCHOLESTEROLEMIA: ICD-10-CM

## 2020-11-16 RX ORDER — SIMVASTATIN 20 MG/1
TABLET, FILM COATED ORAL
Qty: 90 TAB | Refills: 0 | Status: SHIPPED | OUTPATIENT
Start: 2020-11-16 | End: 2021-03-31 | Stop reason: SDUPTHER

## 2020-11-24 ENCOUNTER — PATIENT MESSAGE (OUTPATIENT)
Dept: INTERNAL MEDICINE CLINIC | Age: 65
End: 2020-11-24

## 2020-12-31 RX ORDER — LISINOPRIL 20 MG/1
TABLET ORAL
Qty: 90 TAB | Refills: 0 | Status: SHIPPED | OUTPATIENT
Start: 2020-12-31 | End: 2021-03-31 | Stop reason: SDUPTHER

## 2021-03-12 DIAGNOSIS — E78.00 HYPERCHOLESTEROLEMIA: ICD-10-CM

## 2021-03-12 NOTE — LETTER
3/15/2021 [address] [facility] 7 [address] Dear Jf Sanz My records indicate that you are overdue for a follow up appointment. It is important to maintain your appointments so that I can monitor your health. Please call our office at [phone] to schedule an appointment. Refused Prescriptions Name from pharmacy: Simvastatin 20 MG Oral Tablet Will file in chart as: simvastatin (ZOCOR) 20 mg tablet Sig: Take 1 tablet by mouth nightly Disp:  90 Tab    Refills:  0 Start: 3/13/2021 Class: Normal  
 Refused by: Roberto Thao MD  
 Refusal reason:Appointment required Sincerely, 
Roberto Thao MD 
[phone] 
[address] [facility] 
[phone]

## 2021-03-13 RX ORDER — SIMVASTATIN 20 MG/1
TABLET, FILM COATED ORAL
Qty: 90 TAB | Refills: 0 | OUTPATIENT
Start: 2021-03-13

## 2021-03-24 DIAGNOSIS — E78.00 HYPERCHOLESTEROLEMIA: ICD-10-CM

## 2021-03-24 RX ORDER — SIMVASTATIN 40 MG/1
40 TABLET, FILM COATED ORAL
Qty: 90 TAB | Refills: 0 | Status: SHIPPED | OUTPATIENT
Start: 2021-03-24 | End: 2021-03-31

## 2021-03-31 ENCOUNTER — OFFICE VISIT (OUTPATIENT)
Dept: INTERNAL MEDICINE CLINIC | Age: 66
End: 2021-03-31
Payer: COMMERCIAL

## 2021-03-31 VITALS
OXYGEN SATURATION: 98 % | DIASTOLIC BLOOD PRESSURE: 90 MMHG | BODY MASS INDEX: 42.35 KG/M2 | RESPIRATION RATE: 16 BRPM | HEART RATE: 89 BPM | TEMPERATURE: 97.3 F | SYSTOLIC BLOOD PRESSURE: 140 MMHG | WEIGHT: 239 LBS | HEIGHT: 63 IN

## 2021-03-31 DIAGNOSIS — I10 ESSENTIAL HYPERTENSION: ICD-10-CM

## 2021-03-31 DIAGNOSIS — Z00.00 GENERAL MEDICAL EXAMINATION: Primary | ICD-10-CM

## 2021-03-31 DIAGNOSIS — E78.00 HYPERCHOLESTEROLEMIA: ICD-10-CM

## 2021-03-31 DIAGNOSIS — Z12.11 COLON CANCER SCREENING: ICD-10-CM

## 2021-03-31 DIAGNOSIS — Z12.31 BREAST CANCER SCREENING BY MAMMOGRAM: ICD-10-CM

## 2021-03-31 PROCEDURE — 99397 PER PM REEVAL EST PAT 65+ YR: CPT | Performed by: INTERNAL MEDICINE

## 2021-03-31 RX ORDER — SIMVASTATIN 20 MG/1
TABLET, FILM COATED ORAL
Qty: 90 TAB | Refills: 1 | Status: SHIPPED | OUTPATIENT
Start: 2021-03-31 | End: 2021-09-29 | Stop reason: SDUPTHER

## 2021-03-31 RX ORDER — LISINOPRIL 20 MG/1
20 TABLET ORAL DAILY
Qty: 90 TAB | Refills: 1 | Status: SHIPPED | OUTPATIENT
Start: 2021-03-31 | End: 2021-09-29 | Stop reason: SDUPTHER

## 2021-03-31 RX ORDER — CHLORTHALIDONE 25 MG/1
TABLET ORAL
Qty: 90 TAB | Refills: 1 | Status: SHIPPED | OUTPATIENT
Start: 2021-03-31 | End: 2021-09-29 | Stop reason: SDUPTHER

## 2021-03-31 NOTE — PROGRESS NOTES
Patient has been informed of any other discussion outside the parameters of the physical could result in other charges including a co pay, patient verbalized understanding. 1. Have you been to the ER, urgent care clinic since your last visit? Hospitalized since your last visit? No    2. Have you seen or consulted any other health care providers outside of the [address] [place] [address] since your last visit? Include any pap smears or colon screening.  No   Chief Complaint   Patient presents with    Complete Physical

## 2021-03-31 NOTE — PROGRESS NOTES
Juanpablo Muniz is here for complete health maintenance physical exam and screening. she does have other concerns  left knee pain    Health maintenance hx includes:  Exercise: moderately active. Form of exercise: 3 times a week    Diet: generally follows a low fat low cholesterol diet, exercises sporadically  unknown occupation  Cancer screening:    Colon cancer screening:  Last Colonoscopy: never and   was exam deferred   Breast cancer screening: last mammogram 2017 and   was normal   Cervical cancer screening: last PAP/Pelvic exam: 2015  and was normal.  Immunizations:     Immunization History   Administered Date(s) Administered    Covid-19, MODERNA, Mrna, Lnp-s, Pf, 100mcg/0.5mL 02/22/2021, 03/22/2021    Influenza Vaccine 10/09/2014    Influenza Vaccine (Quad) Mdck Pf (>4 Yrs Flucelvax QUAD 82602) 09/26/2019, 09/27/2019    Influenza Vaccine (Tri) Adjuvanted (>65 Yrs FLUAD TRI 97428) 02/20/2019    Pneumococcal Polysaccharide (PPSV-23) 11/12/2013    TB Skin Test (PPD) Intradermal 10/09/2014    TDAP Vaccine 06/18/2012      Immunization status: up to date and documented.        Social History     Socioeconomic History    Marital status:      Spouse name: Not on file    Number of children: Not on file    Years of education: Not on file    Highest education level: Not on file   Occupational History    Not on file   Social Needs    Financial resource strain: Not on file    Food insecurity     Worry: Not on file     Inability: Not on file    Transportation needs     Medical: Not on file     Non-medical: Not on file   Tobacco Use    Smoking status: Never Smoker    Smokeless tobacco: Never Used   Substance and Sexual Activity    Alcohol use: No    Drug use: No    Sexual activity: Not Currently     Partners: Male   Lifestyle    Physical activity     Days per week: Not on file     Minutes per session: Not on file    Stress: Not on file   Relationships    Social connections     Talks on phone: Not on file     Gets together: Not on file     Attends Orthodox service: Not on file     Active member of club or organization: Not on file     Attends meetings of clubs or organizations: Not on file     Relationship status: Not on file    Intimate partner violence     Fear of current or ex partner: Not on file     Emotionally abused: Not on file     Physically abused: Not on file     Forced sexual activity: Not on file   Other Topics Concern    Not on file   Social History Narrative    Not on file     Past Surgical History:   Procedure Laterality Date    HX GYN      BTL   Ronel Amen  12/11/14    left knee replacement     Family History   Problem Relation Age of Onset    Heart Disease Mother     Stroke Mother     Heart Disease Father     Cancer Father         prostate     Current Outpatient Medications on File Prior to Visit   Medication Sig Dispense Refill    cholecalciferol (VITAMIN D3) 1,000 unit tablet Take 2,000 Units by mouth daily.  leflunomide (ARAVA) 20 mg tablet Take 20 mg by mouth daily.  methotrexate (RHEUMATREX) 2.5 mg tablet Take 20 mg by mouth every Sunday.  azelastine (ASTELIN) 137 mcg (0.1 %) nasal spray 1 [place] by Both Nostrils route two (2) times a day. Use in each nostril as directed 1 Bottle 1    folic acid (FOLVITE) 1 mg tablet Take 3 mg by mouth daily. No current facility-administered medications on file prior to visit. .  Vitals:    03/31/21 1326 03/31/21 1341   BP: (!) 168/89 (!) 140/90   Pulse: 89    Resp: 16    Temp: 97.3 °F (36.3 °C)    TempSrc: Temporal    SpO2: 98%    Weight: 239 lb (108.4 kg)    Height: 5' 3\" (1.6 m)      The physical exam is generally normal. Patient appears well, alert and oriented x 3, pleasant, cooperative. Vitals are as noted. Neck supple and free of adenopathy, or masses. No thyromegaly. EN. Ears, throat are normal. Lungs are clear to auscultation. Heart sounds are normal, no murmurs, clicks, gallops or rubs. Abdomen is soft, no tenderness, masses or organomegaly. Breasts: breasts appear normal, no suspicious masses, no skin or nipple changes or axillary nodes. Self exam is encouraged. . Extremities are normal. Peripheral pulses are normal. Screening neurological exam is normal without focal findings. Skin is normal without suspicious lesions noted. Diagnoses and all orders for this visit:    1. Breast cancer screening by mammogram  -     [facility] MAMMO BI SCREENING INCL CAD; Future    2. Colon cancer screening  -     REFERRAL TO GASTROENTEROLOGY    3. Hypercholesterolemia  -     CBC WITH AUTOMATED DIFF; Future  -     LIPID PANEL; Future  -     METABOLIC PANEL, COMPREHENSIVE; Future  -     simvastatin (ZOCOR) 20 mg tablet; Take 1 tablet by mouth nightly    4. Essential hypertension  -     CBC WITH AUTOMATED DIFF; Future  -     LIPID PANEL; Future  -     METABOLIC PANEL, COMPREHENSIVE; Future  -     lisinopriL (PRINIVIL, ZESTRIL) 20 mg tablet; Take 1 Tab by mouth daily.   -     chlorthalidone (HYGROTON) 25 mg tablet; TAKE ONE TABLET BY MOUTH EVERY DAY      BP meds as ordered      The patient is advised to begin progressive daily aerobic exercise program, follow a low fat, low cholesterol diet and attempt to lose weightswitch petr simva 20      Lab Results   Component Value Date/Time    Cholesterol, total 229 (H) 02/22/2020 08:42 AM    HDL Cholesterol 57 02/22/2020 08:42 AM    LDL, calculated 144 (H) 02/22/2020 08:42 AM    VLDL, calculated 28 02/22/2020 08:42 AM    Triglyceride 142 02/22/2020 08:42 AM    CHOL/HDL Ratio 4.6 01/27/2012 09:00 AM

## 2021-04-27 ENCOUNTER — HOSPITAL ENCOUNTER (OUTPATIENT)
Dept: MAMMOGRAPHY | Age: 66
Discharge: HOME | End: 2021-04-27
Attending: INTERNAL MEDICINE
Payer: COMMERCIAL

## 2021-04-27 DIAGNOSIS — Z12.31 BREAST CANCER SCREENING BY MAMMOGRAM: ICD-10-CM

## 2021-04-27 PROCEDURE — 77067 SCR MAMMO BI INCL CAD: CPT

## 2021-05-25 ENCOUNTER — HOSPITAL ENCOUNTER (OUTPATIENT)
Dept: MAMMOGRAPHY | Age: 66
Discharge: HOME | End: 2021-05-25
Payer: COMMERCIAL

## 2021-05-25 ENCOUNTER — TRANSCRIBE ORDER (OUTPATIENT)
Dept: MAMMOGRAPHY | Age: 66
End: 2021-05-25

## 2021-05-25 DIAGNOSIS — R92.8 ABNORMAL MAMMOGRAM: ICD-10-CM

## 2021-05-25 DIAGNOSIS — R92.8 ABNORMAL MAMMOGRAM: Primary | ICD-10-CM

## 2021-05-25 PROCEDURE — 77065 DX MAMMO INCL CAD UNI: CPT

## 2021-09-29 ENCOUNTER — OFFICE VISIT (OUTPATIENT)
Dept: INTERNAL MEDICINE CLINIC | Age: 66
End: 2021-09-29
Payer: COMMERCIAL

## 2021-09-29 VITALS
SYSTOLIC BLOOD PRESSURE: 130 MMHG | DIASTOLIC BLOOD PRESSURE: 75 MMHG | RESPIRATION RATE: 20 BRPM | BODY MASS INDEX: 43.24 KG/M2 | TEMPERATURE: 98.3 F | HEIGHT: 62 IN | OXYGEN SATURATION: 97 % | WEIGHT: 235 LBS | HEART RATE: 98 BPM

## 2021-09-29 DIAGNOSIS — M05.7A RHEUMATOID ARTHRITIS OF OTHER SITE WITH POSITIVE RHEUMATOID FACTOR (HCC): ICD-10-CM

## 2021-09-29 DIAGNOSIS — E78.00 HYPERCHOLESTEROLEMIA: ICD-10-CM

## 2021-09-29 DIAGNOSIS — D32.9 MENINGIOMA (HCC): ICD-10-CM

## 2021-09-29 DIAGNOSIS — I10 ESSENTIAL HYPERTENSION: Primary | ICD-10-CM

## 2021-09-29 DIAGNOSIS — E66.01 OBESITY, CLASS III, BMI 40-49.9 (MORBID OBESITY) (HCC): ICD-10-CM

## 2021-09-29 DIAGNOSIS — Z12.11 COLON CANCER SCREENING: ICD-10-CM

## 2021-09-29 PROCEDURE — 99213 OFFICE O/P EST LOW 20 MIN: CPT | Performed by: INTERNAL MEDICINE

## 2021-09-29 RX ORDER — LISINOPRIL 20 MG/1
20 TABLET ORAL DAILY
Qty: 90 TABLET | Refills: 1 | Status: SHIPPED | OUTPATIENT
Start: 2021-09-29 | End: 2022-03-29 | Stop reason: SDUPTHER

## 2021-09-29 RX ORDER — CHLORTHALIDONE 25 MG/1
TABLET ORAL
Qty: 90 TABLET | Refills: 1 | Status: SHIPPED | OUTPATIENT
Start: 2021-09-29

## 2021-09-29 RX ORDER — SIMVASTATIN 20 MG/1
TABLET, FILM COATED ORAL
Qty: 90 TABLET | Refills: 1 | Status: SHIPPED | OUTPATIENT
Start: 2021-09-29 | End: 2022-03-29 | Stop reason: SDUPTHER

## 2021-09-29 NOTE — PROGRESS NOTES
1. Have you been to the ER, urgent care clinic since your last visit? Hospitalized since your last visit? No    2. Have you seen or consulted any other health care providers outside of the [address] [place] [address] since your last visit? Include any pap smears or colon screening.  No    Chief Complaint   Patient presents with    Hypertension    Shoulder Pain     Visit Vitals  BP (!) 148/78   Pulse 98   Temp 98.3 °F (36.8 °C) (Temporal)   Resp 20   Ht 5' 2\" (1.575 m)   Wt 235 lb (106.6 kg)   SpO2 97%   BMI 42.98 kg/m²

## 2021-09-29 NOTE — PROGRESS NOTES
Amor Mercer is a 74 y.o. female with the following Problems and Medications. Patient Active Problem List   Diagnosis Code    HTN (hypertension) I10    Arthritis of knee M17.10    Hypercholesterolemia E78.00    Obesity E66.9    Rheumatoid arthritis M06.9    DVT (deep venous thrombosis) ([facility]) I82.409    Meningioma ([facility]) D32.9    Obesity, morbid ([facility]) E66.01     Current Outpatient Medications   Medication Sig Dispense Refill    simvastatin (ZOCOR) 20 mg tablet Take 1 tablet by mouth nightly 90 Tablet 1    lisinopriL (PRINIVIL, ZESTRIL) 20 mg tablet Take 1 Tablet by mouth daily. 90 Tablet 1    chlorthalidone (HYGROTON) 25 mg tablet TAKE ONE TABLET BY MOUTH EVERY DAY 90 Tablet 1    cholecalciferol (VITAMIN D3) 1,000 unit tablet Take 2,000 Units by mouth daily.  leflunomide (ARAVA) 20 mg tablet Take 20 mg by mouth daily.  methotrexate (RHEUMATREX) 2.5 mg tablet Take 20 mg by mouth every Sunday.  azelastine (ASTELIN) 137 mcg (0.1 %) nasal spray 1 [place] by Both Nostrils route two (2) times a day. Use in each nostril as directed 1 Bottle 1    folic acid (FOLVITE) 1 mg tablet Take 3 mg by mouth daily.              Cardiovascular ROS: no chest pain or dyspnea on exertion  Neurological ROS: no TIA or stroke symptoms  General ROS: negative for - chills, fatigue, fever, malaise, night sweats or sleep disturbance  Endocrine ROS: negative for - hair pattern changes, hot flashes, malaise/lethargy, palpitations, polydipsia/polyuria, temperature intolerance or unexpected weight changes  Sleep ok sometimes   Often poor  Low energy feels tired all day  Never did sleep eval    no apparent distress    Vitals:    09/29/21 1617 09/29/21 1641   BP: (!) 148/78 130/75   Pulse: 98    Resp: 20    Temp: 98.3 °F (36.8 °C)    TempSrc: Temporal    SpO2: 97%    Weight: 235 lb (106.6 kg)    Height: 5' 2\" (1.575 m)      Vitals:    09/29/21 1617 09/29/21 1641   BP: (!) 148/78 130/75   Pulse: 98    Resp: 20    Temp: 98.3 °F (36.8 °C)    TempSrc: Temporal    SpO2: 97%    Weight: 235 lb (106.6 kg)    Height: 5' 2\" (1.575 m)        no apparent distress  Paranasal sinuses are normal. No tenderness to the maxillary, frontal, ethmoids or mastoids. Transillumination of the maxillary sinuses is normal.    S1 and S2 normal, no murmurs, clicks, gallops or rubs. Regular rate and rhythm. Chest is clear; no wheezes or rales. No edema or JVD. Synovitis hands and rheumatoid nodules on forearms        Hypertension faircontrolled for age based on guidelines    Work on weight  Low sodium diet    Flu vaccine advised, covid  # 3 advised  Considering imunological seeing rheumatology      Diagnoses and all orders for this visit:    1. Essential hypertension  -     CBC WITH AUTOMATED DIFF; Future  -     LIPID PANEL; Future  -     METABOLIC PANEL, COMPREHENSIVE; Future  -     lisinopriL (PRINIVIL, ZESTRIL) 20 mg tablet; Take 1 Tablet by mouth daily. -     chlorthalidone (HYGROTON) 25 mg tablet; TAKE ONE TABLET BY MOUTH EVERY DAY    2. Rheumatoid arthritis of other site with positive rheumatoid factor ([facility] [address]    3. Meningioma ([facility] [address]    4. Hypercholesterolemia  -     CBC WITH AUTOMATED DIFF; Future  -     LIPID PANEL; Future  -     METABOLIC PANEL, COMPREHENSIVE; Future  -     simvastatin (ZOCOR) 20 mg tablet; Take 1 tablet by mouth nightly    5. Obesity, Class III, BMI 40-49.9 (morbid obesity) ([address]    6.  Colon cancer screening  -     REFERRAL TO GASTROENTEROLOGY

## 2021-12-14 NOTE — PROGRESS NOTES
3rd attempt to reach the patient with no success. Expected Date Of Service: 12/14/2021 Performing Laboratory: -27 Bill For Surgical Tray: no Billing Type: Third-Party Bill

## 2022-03-19 PROBLEM — D32.9 MENINGIOMA (HCC): Status: ACTIVE | Noted: 2017-07-31

## 2022-03-19 PROBLEM — E66.01 OBESITY, MORBID (HCC): Status: ACTIVE | Noted: 2018-06-01

## 2022-03-29 ENCOUNTER — OFFICE VISIT (OUTPATIENT)
Dept: INTERNAL MEDICINE CLINIC | Age: 67
End: 2022-03-29
Payer: COMMERCIAL

## 2022-03-29 VITALS
DIASTOLIC BLOOD PRESSURE: 77 MMHG | HEART RATE: 92 BPM | BODY MASS INDEX: 45.11 KG/M2 | SYSTOLIC BLOOD PRESSURE: 125 MMHG | HEIGHT: 63 IN | OXYGEN SATURATION: 97 % | WEIGHT: 254.6 LBS | TEMPERATURE: 98.2 F | RESPIRATION RATE: 14 BRPM

## 2022-03-29 DIAGNOSIS — Z86.711 HISTORY OF PULMONARY EMBOLISM: ICD-10-CM

## 2022-03-29 DIAGNOSIS — M05.7A RHEUMATOID ARTHRITIS OF OTHER SITE WITH POSITIVE RHEUMATOID FACTOR (HCC): ICD-10-CM

## 2022-03-29 DIAGNOSIS — I10 ESSENTIAL HYPERTENSION: ICD-10-CM

## 2022-03-29 DIAGNOSIS — E78.00 HYPERCHOLESTEROLEMIA: ICD-10-CM

## 2022-03-29 DIAGNOSIS — E66.01 MORBID OBESITY WITH BMI OF 45.0-49.9, ADULT (HCC): ICD-10-CM

## 2022-03-29 PROBLEM — D32.9 MENINGIOMA (HCC): Status: RESOLVED | Noted: 2017-07-31 | Resolved: 2022-03-29

## 2022-03-29 PROCEDURE — 99214 OFFICE O/P EST MOD 30 MIN: CPT | Performed by: INTERNAL MEDICINE

## 2022-03-29 RX ORDER — RIVAROXABAN 15 MG/1
TABLET, FILM COATED ORAL
COMMUNITY
Start: 2022-03-22 | End: 2022-04-19 | Stop reason: DRUGHIGH

## 2022-03-29 RX ORDER — LEFLUNOMIDE 10 MG/1
10 TABLET ORAL DAILY
COMMUNITY
Start: 2022-01-13 | End: 2022-03-29 | Stop reason: DRUGHIGH

## 2022-03-29 RX ORDER — SIMVASTATIN 20 MG/1
TABLET, FILM COATED ORAL
Qty: 90 TABLET | Refills: 1 | Status: SHIPPED | OUTPATIENT
Start: 2022-03-29

## 2022-03-29 RX ORDER — LISINOPRIL 20 MG/1
20 TABLET ORAL DAILY
Qty: 90 TABLET | Refills: 1 | Status: SHIPPED | OUTPATIENT
Start: 2022-03-29 | End: 2022-09-14 | Stop reason: DRUGHIGH

## 2022-03-29 RX ORDER — ETANERCEPT 50 MG/ML
SOLUTION SUBCUTANEOUS
COMMUNITY
Start: 2022-03-14

## 2022-03-29 NOTE — PROGRESS NOTES
Reviewed record in preparation for visit and have obtained necessary documentation. Identified pt with two pt identifiers(name and ). Chief Complaint   Patient presents with    Follow-up     Vitals:    22 1157   BP: 125/77   Pulse: 92   Resp: 14   Temp: 98.2 °F (36.8 °C)   TempSrc: Temporal   SpO2: 97%   Weight: 254 lb 9.6 oz (115.5 kg)   Height: 5' 3\" (1.6 m)        Health Maintenance Due   Topic Date Due    Hepatitis C Test  Never done    Shingles Vaccine (1 of 2) Never done    Colorectal Screening  2015    Pneumococcal Vaccine (1 of 1 - PPSV23) 2019    Cholesterol Test   2021    COVID-19 Vaccine (3 - Booster for [place] series) 2021    Yearly Flu Vaccine (1) 2021       Ms. Tremaine Abbott has a reminder for a \"due or due soon\" health maintenance. I have asked that she discuss this further with her primary care provider for follow-up on this health maintenance. Coordination of Care Questionnaire:  :     1) Have you been to an emergency room, urgent care clinic since your last visit? yes   Hospitalized since your last visit? yes             2) Have you seen or consulted any other health care providers outside of [address] [place] [address] since your last visit? no  (Include any pap smears or colon screenings in this section.)    3. For patients aged 39-70: Has the patient had a colonoscopy / FIT/ Cologuard? No      If the patient is female:  4. For patients aged 41-77: Has the patient had a mammogram within the past 2 years? No       5. For patients aged 21-65: Has the patient had a pap smear? No      In the event something were to happen to you and you were unable to speak on your behalf, do you have an Advance Directive/ Living Will in place stating your wishes?  NO    Do you have an Advance Directive on file? no    6) Are you interested in receiving information on Advance Directives?  no    Patient is accompanied by self I have received verbal consent from [facility] to discuss any/all medical information while they are present in the room.

## 2022-03-29 NOTE — PROGRESS NOTES
Problem follow up:  Ephraim Palacios returns for follow up visit regarding hosp stay  she was seen 1 weeks ago in [facility] diagnosed with PE and treated with heparin and xarelto. Workup was significant for **PE. Notes, labs, studies, imaging related to this problem during prior visit were not available . Since that visit, she has improved. she has been compliant with prescribed treatment. Residual symptoms include: left pleuitic pain and mild WEBB  New issues associated with this problem include: none this second PE no precipitating factor  Last post op TKR 9 years ago. Tolerating xarelto    Seeing hematology next week Al[place]      SUBJECTIVE: Ephraim Palacios is a 74 y.o. female seen for a follow up visit; she has hypertension, hyperlipidemia and no known cardiovascular conditions. Current Outpatient Medications   Medication Sig Dispense Refill    lisinopriL (PRINIVIL, ZESTRIL) 20 mg tablet Take 1 Tablet by mouth daily. 90 Tablet 1    simvastatin (ZOCOR) 20 mg tablet Take 1 tablet by mouth nightly 90 Tablet 1    chlorthalidone (HYGROTON) 25 mg tablet TAKE ONE TABLET BY MOUTH EVERY DAY 90 Tablet 1    cholecalciferol (VITAMIN D3) 1,000 unit tablet Take 2,000 Units by mouth daily.  leflunomide (ARAVA) 20 mg tablet Take 20 mg by mouth daily.  methotrexate (RHEUMATREX) 2.5 mg tablet Take 20 mg by mouth every Sunday.  azelastine (ASTELIN) 137 mcg (0.1 %) nasal spray 1 [place] by Both Nostrils route two (2) times a day. Use in each nostril as directed 1 Bottle 1    folic acid (FOLVITE) 1 mg tablet Take 3 mg by mouth daily.       EnbreL SureClick 50 mg/mL (1 mL) injection       Xarelto 15 mg tab tablet TAKE 1 TABLET BY MOUTH TWICE DAILY AT 8 AM AND 5 PM       Patient Active Problem List   Diagnosis Code    HTN (hypertension) I10    Arthritis of knee M17.10    Hypercholesterolemia E78.00    Obesity E66.9    Rheumatoid arthritis M06.9    Obesity, morbid (HCC) E66.01    History of pulmonary embolism Z86.711     System Review: Cardiovascular ROS - taking medications as instructed, no medication side effects noted, home BP monitoring in range of 044'O systolic over 26'T diastolic, no TIA's, no chest pain on exertion, no dyspnea on exertion, no swelling of ankles, no palpitations, CNS ROS - no TIA or stroke-like symptoms. New concerns: stable   But weight poor  May be interested in bariatric procedure. OBJECTIVE:  Visit Vitals  /77 (BP 1 Location: Left upper arm, BP Patient Position: Sitting, BP Cuff Size: Large adult)   Pulse 92   Temp 98.2 °F (36.8 °C) (Temporal)   Resp 14   Ht 5' 3\" (1.6 m)   Wt 254 lb 9.6 oz (115.5 kg)   SpO2 97%   BMI 45.10 kg/m²      Appearance: alert, well appearing, and in no distress and overweight. General exam: CVS exam BP noted to be well controlled today in office, S1, S2 normal, no gallop, no murmur, chest clear, no JVD, no HSM, no edema. Lab review: no lab studies available for review at time of visit. ASSESSMENT:  hypertension stable, hyperlipidemia reasonably well controlled. PLAN:  current treatment plan is effective, no change in therapy  lab results and schedule of future lab studies reviewed with patient  repeat labs ordered prior to next appointment. Get   Reports fro [address] [facility]ofelong anticoagulation    1. History of pulmonary embolism  Recent     2. Rheumatoid arthritis of other site with positive rheumatoid factor (HCC)  Now enbrel starting    3. Essential hypertension  controlled  - lisinopriL (PRINIVIL, ZESTRIL) 20 mg tablet; Take 1 Tablet by mouth daily. Dispense: 90 Tablet; Refill: 1    4. Hypercholesterolemia  Has been stable  - simvastatin (ZOCOR) 20 mg tablet; Take 1 tablet by mouth nightly  Dispense: 90 Tablet; Refill: 1    5. Morbid obesity with BMI of 45.0-49.9, adult (HCC)  /Body mass index is 45.1 kg/m².   Big risk

## 2022-04-14 ENCOUNTER — TELEPHONE (OUTPATIENT)
Dept: INTERNAL MEDICINE CLINIC | Age: 67
End: 2022-04-14

## 2022-04-14 NOTE — TELEPHONE ENCOUNTER
----- Message from Conny Councilman sent at 4/14/2022 11:35 AM EDT -----  Subject: Message to Provider    QUESTIONS  Information for Provider? Maria Isabel Humphries dropped off paperwork 4/11 for short   term disability and note to return to work 4/18. Terese Valdes is asking when   she can pick the paperwork up.   ---------------------------------------------------------------------------  --------------  [address]  What is the best way for the office to contact you? OK to leave message on   voicemail  Preferred Call Back Phone Number? [phone]  ---------------------------------------------------------------------------  --------------  SCRIPT ANSWERS  Relationship to Patient?  Self

## 2022-04-14 NOTE — TELEPHONE ENCOUNTER
Monik Valladares dropped off paperwork 4/11 for short term disability and note to return to work 4/18. Bassem Diallo is asking when she can pick the paperwork up.

## 2022-04-15 ENCOUNTER — TELEPHONE (OUTPATIENT)
Dept: INTERNAL MEDICINE CLINIC | Age: 67
End: 2022-04-15

## 2022-04-15 ENCOUNTER — DOCUMENTATION ONLY (OUTPATIENT)
Dept: INTERNAL MEDICINE CLINIC | Age: 67
End: 2022-04-15

## 2022-04-15 NOTE — PROGRESS NOTES
Documents for FMLA have been faxed over to Dr. Porfirio La per Dr. Ariella Smith, patient aware, documents also ready for  front for patient.

## 2022-04-15 NOTE — TELEPHONE ENCOUNTER
Patient son Doug au, came into the office on 4/15/22 upset he has some concerns about the [place] paperwork that  refused to sign, per  patient needs to have Greg Canales (hematology/oncology) needs to be the one to fill out the paperwork. Per son  refused to sign off on the FMLA forms as well.  Son is upset that no one is trying to help his mother out and would like to speak with , patient can be reached at [phone]

## 2022-04-19 ENCOUNTER — OFFICE VISIT (OUTPATIENT)
Dept: INTERNAL MEDICINE CLINIC | Age: 67
End: 2022-04-19
Payer: COMMERCIAL

## 2022-04-19 VITALS
BODY MASS INDEX: 44.47 KG/M2 | TEMPERATURE: 97.2 F | SYSTOLIC BLOOD PRESSURE: 131 MMHG | RESPIRATION RATE: 16 BRPM | WEIGHT: 251 LBS | HEIGHT: 63 IN | OXYGEN SATURATION: 100 % | HEART RATE: 65 BPM | DIASTOLIC BLOOD PRESSURE: 78 MMHG

## 2022-04-19 DIAGNOSIS — Z86.711 HISTORY OF PULMONARY EMBOLISM: Primary | ICD-10-CM

## 2022-04-19 PROCEDURE — 99213 OFFICE O/P EST LOW 20 MIN: CPT | Performed by: INTERNAL MEDICINE

## 2022-04-19 NOTE — PROGRESS NOTES
1. Have you been to the ER, urgent care clinic since your last visit? Hospitalized since your last visit? No    2. Have you seen or consulted any other health care providers outside of the [address] [place] [address] since your last visit? Include any pap smears or colon screening.  No   Chief Complaint   Patient presents with    Other     paperwork to go back to work

## 2022-04-19 NOTE — PROGRESS NOTES
Problem follow up:  Lisette Cooper returns for follow up visit regarding hosp stay  she was seen 4 weeks ago in [facility] diagnosed with PE and treated with heparin and xarelto. Workup was significant for **PE. Notes, labs, studies, imaging related to this problem during prior visit were not available . Since that visit, she has improved. she has been compliant with prescribed treatment. Residual symptoms include:  mild WEBB  New issues associated with this problem include: none this second PE no precipitating factor  Last post op TKR 9 years ago. Tolerating xarelto    Seeing hematology they have discharged      SUBJECTIVE: Lisette Cooper is a 74 y.o. female seen for a follow up visit; she has hypertension, hyperlipidemia and no known cardiovascular conditions. Current Outpatient Medications   Medication Sig Dispense Refill    rivaroxaban (Xarelto) 20 mg tab tablet Take 1 Tablet by mouth daily (with breakfast). 30 Tablet 5    EnbreL SureClick 50 mg/mL (1 mL) injection       lisinopriL (PRINIVIL, ZESTRIL) 20 mg tablet Take 1 Tablet by mouth daily. 90 Tablet 1    simvastatin (ZOCOR) 20 mg tablet Take 1 tablet by mouth nightly 90 Tablet 1    chlorthalidone (HYGROTON) 25 mg tablet TAKE ONE TABLET BY MOUTH EVERY DAY 90 Tablet 1    cholecalciferol (VITAMIN D3) 1,000 unit tablet Take 2,000 Units by mouth daily.  leflunomide (ARAVA) 20 mg tablet Take 20 mg by mouth daily.  methotrexate (RHEUMATREX) 2.5 mg tablet Take 20 mg by mouth every Sunday.  azelastine (ASTELIN) 137 mcg (0.1 %) nasal spray 1 [place] by Both Nostrils route two (2) times a day. Use in each nostril as directed 1 Bottle 1    folic acid (FOLVITE) 1 mg tablet Take 3 mg by mouth daily.        Patient Active Problem List   Diagnosis Code    HTN (hypertension) I10    Arthritis of knee M17.10    Hypercholesterolemia E78.00    Obesity E66.9    Rheumatoid arthritis M06.9    Obesity, morbid ([facility] [address] E66.01    History of pulmonary embolism Z86.711     System Review: Cardiovascular ROS - taking medications as instructed, no medication side effects noted, home BP monitoring in range of 676'G systolic over 90'Y diastolic, no TIA's, no chest pain on exertion, no dyspnea on exertion, no swelling of ankles, no palpitations, CNS ROS - no TIA or stroke-like symptoms. OBJECTIVE:  Visit Vitals  /78   Pulse 65   Temp 97.2 °F (36.2 °C) (Temporal)   Resp 16   Ht 5' 3\" (1.6 m)   Wt 251 lb (113.9 kg)   SpO2 100%   BMI 44.46 kg/m²      Appearance: alert, well appearing, and in no distress and overweight. General exam: CVS exam BP noted to be well controlled today in office, S1, S2 normal, no gallop, no murmur, chest clear, no JVD, no HSM, no edema. Lab review: no lab studies available for review at time of visit. ASSESSMENT:  hypertension stable, hyperlipidemia reasonably well controlled. PLAN:  current treatment plan is effective, no change in therapy  lab results and schedule of future lab studies reviewed with patient  repeat labs ordered prior to next appointment. Get   Reports fro [address] anticoagulation    Diagnoses and all orders for this visit:    1. History of pulmonary embolism    Other orders  -     rivaroxaban (Xarelto) 20 mg tab tablet; Take 1 Tablet by mouth daily (with breakfast).       Advise  Return to work today or tomorrow see 3 month    Forms completed for patient at time of visit to expedite their care

## 2022-07-15 NOTE — TELEPHONE ENCOUNTER
----- Message from Kristel Berger sent at 7/8/2022  9:14 AM EDT -----  Subject: Refill Request    QUESTIONS  Name of Medication? rivaroxaban (Xarelto) 20 mg tab tablet  Patient-reported dosage and instructions? 20 mg tablet 1x day  How many days do you have left? 30  [facility]? [facility]  Pharmacy phone number (if available)? [phone]  Additional Information for Provider? Patient appt for 7/19/22 was   cancelled. will be going out of town on 7/22/22 and will run out of   medication while she is gone through 8/8/22 for appt. will need a partial   fill before leaving on trip to ensure has enough medication. Please call   to discuss needs of medication for about 5 days. ---------------------------------------------------------------------------  --------------  Nicky ANGUIANO  What is the best way for the office to contact you? OK to leave message on   voicemail  Preferred Call Back Phone Number? [phone]  ---------------------------------------------------------------------------  --------------  SCRIPT ANSWERS  Relationship to Patient?  Self

## 2022-08-08 ENCOUNTER — OFFICE VISIT (OUTPATIENT)
Dept: INTERNAL MEDICINE CLINIC | Age: 67
End: 2022-08-08
Payer: COMMERCIAL

## 2022-08-08 VITALS
HEART RATE: 88 BPM | BODY MASS INDEX: 43.94 KG/M2 | WEIGHT: 248 LBS | OXYGEN SATURATION: 97 % | RESPIRATION RATE: 20 BRPM | TEMPERATURE: 98 F | HEIGHT: 63 IN | SYSTOLIC BLOOD PRESSURE: 130 MMHG | DIASTOLIC BLOOD PRESSURE: 73 MMHG

## 2022-08-08 DIAGNOSIS — Z12.11 SCREEN FOR COLON CANCER: ICD-10-CM

## 2022-08-08 DIAGNOSIS — E78.00 HYPERCHOLESTEROLEMIA: ICD-10-CM

## 2022-08-08 DIAGNOSIS — Z86.711 HISTORY OF PULMONARY EMBOLISM: Primary | ICD-10-CM

## 2022-08-08 PROCEDURE — 99213 OFFICE O/P EST LOW 20 MIN: CPT | Performed by: INTERNAL MEDICINE

## 2022-08-08 NOTE — PROGRESS NOTES
Chief Complaint   Patient presents with    Hypertension    Cholesterol Problem       Vitals:    08/08/22 1528   BP: 130/73   Pulse: 88   Resp: 20   Temp: 98 °F (36.7 °C)   TempSrc: Temporal   SpO2: 97%   Weight: 248 lb (112.5 kg)   Height: 5' 3\" (1.6 m)   PainSc:   0 - No pain         Health Maintenance will be followed up by PCP. 1. Have you been to the ER, urgent care clinic since your last visit? Hospitalized since your last visit? No    2. Have you seen or consulted any other health care providers outside of the [address] [place] [address] since your last visit? Include any pap smears or colon screening.  No

## 2022-08-09 NOTE — PROGRESS NOTES
Problem follow up:  Salud Chavez returns for follow up visit regarding hosp stay  she was seen 4 weeks ago in [facility] diagnosed with PE and treated with heparin and xarelto. Workup was significant for **PE. Notes, labs, studies, imaging related to this problem during prior visit were not available . Since that visit, she has improved. she has been compliant with prescribed treatment. Residual symptoms include:  mild WEBB  New issues associated with this problem include: none this second PE no precipitating factor  Last post op TKR 9 years ago. Tolerating xarelto    Seeing hematology they have discharged      SUBJECTIVE: Salud Chavez is a 69 y.o. female seen for a follow up visit; she has hypertension, hyperlipidemia and no known cardiovascular conditions. Current Outpatient Medications   Medication Sig Dispense Refill    [START ON 9/16/2022] rivaroxaban (Xarelto) 15 mg tab tablet Take 1 Tablet by mouth daily (with breakfast). 30 Tablet 5    EnbreL SureClick 50 mg/mL (1 mL) injection       lisinopriL (PRINIVIL, ZESTRIL) 20 mg tablet Take 1 Tablet by mouth daily. 90 Tablet 1    simvastatin (ZOCOR) 20 mg tablet Take 1 tablet by mouth nightly 90 Tablet 1    chlorthalidone (HYGROTON) 25 mg tablet TAKE ONE TABLET BY MOUTH EVERY DAY 90 Tablet 1    cholecalciferol (VITAMIN D3) (1000 Units /25 mcg) tablet Take 2,000 Units by mouth daily. leflunomide (ARAVA) 20 mg tablet Take 20 mg by mouth daily. methotrexate (RHEUMATREX) 2.5 mg tablet Take 20 mg by mouth every Sunday. azelastine (ASTELIN) 137 mcg (0.1 %) nasal spray 1 [place] by Both Nostrils route two (2) times a day. Use in each nostril as directed 1 Bottle 1    folic acid (FOLVITE) 1 mg tablet Take 3 mg by mouth daily.        Patient Active Problem List   Diagnosis Code    HTN (hypertension) I10    Arthritis of knee M17.10    Hypercholesterolemia E78.00    Obesity E66.9    Rheumatoid arthritis M06.9    Obesity, morbid ([facility] [address] E66.01    History of pulmonary embolism Z86.711     System Review: Cardiovascular ROS - taking medications as instructed, no medication side effects noted, home BP monitoring in range of 967'B systolic over 80'Z diastolic, no TIA's, no chest pain on exertion, no dyspnea on exertion, no swelling of ankles, no palpitations, CNS ROS - no TIA or stroke-like symptoms. OBJECTIVE:  Visit Vitals  /73 (BP 1 Location: Left upper arm, BP Patient Position: Sitting, BP Cuff Size: Adult)   Pulse 88   Temp 98 °F (36.7 °C) (Temporal)   Resp 20   Ht 5' 3\" (1.6 m)   Wt 248 lb (112.5 kg)   SpO2 97%   BMI 43.93 kg/m²      Appearance: alert, well appearing, and in no distress and overweight. General exam: CVS exam BP noted to be well controlled today in office, S1, S2 normal, no gallop, no murmur, chest clear, no JVD, no HSM, no edema. Lab review: no lab studies available for review at time of visit. ASSESSMENT:  hypertension stable, hyperlipidemia reasonably well controlled. PLAN:  current treatment plan is effective, no change in therapy  lab results and schedule of future lab studies reviewed with patient  repeat labs ordered prior to next appointment. [facility]   [facility] [address] [facility]    Diagnoses and all orders for this visit:    1. Screen for colon cancer  -     REFERRAL TO GASTROENTEROLOGY    2. History of pulmonary embolism  -     LIPID PANEL; Future    3. Hypercholesterolemia  -     LIPID PANEL; Future    Other orders  -     rivaroxaban (Xarelto) 15 mg tab tablet; Take 1 Tablet by mouth daily (with breakfast).     After 6 months of 20 mg xarelto will change to 15 mg long term dose

## 2022-09-12 ENCOUNTER — NURSE TRIAGE (OUTPATIENT)
Dept: OTHER | Facility: CLINIC | Age: 67
End: 2022-09-12

## 2022-09-12 NOTE — TELEPHONE ENCOUNTER
Received call from [facility] at [facility] with Red Flag Complaint. Subjective: Caller states \"for 1 1/2 months has been having headaches. Hurts when lay on right side. Sometimes right side of nose does hurt. \"     Current Symptoms: none right now    Onset:  1 months ago; unchanged    Associated Symptoms: NA    Pain Severity: 7/10; throbbing; intermittent    Temperature: denies fever by unknown method    What has been tried: Tylenol    LMP: NA Pregnant: NA    Recommended disposition: See in Office Today or Tomorrow    Care advice provided, patient verbalizes understanding; denies any other questions or concerns; instructed to call back for any new or worsening symptoms. Patient/Caller agrees with recommended disposition; writer provided warm transfer to [place] at [facility] for appointment scheduling    Attention Provider: Thank you for allowing me to participate in the care of your patient. The patient was connected to triage in response to information provided to the ECC. Please do not respond through this encounter as the response is not directed to a shared pool.         Reason for Disposition   Unexplained headache that is present > 24 hours    Protocols used: Headache-ADULT-OH

## 2022-09-14 ENCOUNTER — OFFICE VISIT (OUTPATIENT)
Dept: INTERNAL MEDICINE CLINIC | Age: 67
End: 2022-09-14
Payer: COMMERCIAL

## 2022-09-14 VITALS
HEIGHT: 63 IN | DIASTOLIC BLOOD PRESSURE: 90 MMHG | OXYGEN SATURATION: 95 % | BODY MASS INDEX: 45.32 KG/M2 | HEART RATE: 107 BPM | RESPIRATION RATE: 16 BRPM | WEIGHT: 255.8 LBS | TEMPERATURE: 98.1 F | SYSTOLIC BLOOD PRESSURE: 142 MMHG

## 2022-09-14 DIAGNOSIS — Z13.5 SCREENING FOR EYE CONDITION: Primary | ICD-10-CM

## 2022-09-14 DIAGNOSIS — G89.29 CHRONIC NONINTRACTABLE HEADACHE, UNSPECIFIED HEADACHE TYPE: ICD-10-CM

## 2022-09-14 DIAGNOSIS — I10 ESSENTIAL HYPERTENSION: ICD-10-CM

## 2022-09-14 DIAGNOSIS — J30.9 ALLERGIC RHINITIS, UNSPECIFIED SEASONALITY, UNSPECIFIED TRIGGER: ICD-10-CM

## 2022-09-14 DIAGNOSIS — R51.9 CHRONIC NONINTRACTABLE HEADACHE, UNSPECIFIED HEADACHE TYPE: ICD-10-CM

## 2022-09-14 PROCEDURE — 99214 OFFICE O/P EST MOD 30 MIN: CPT | Performed by: PHYSICIAN ASSISTANT

## 2022-09-14 RX ORDER — CETIRIZINE HCL 10 MG
10 TABLET ORAL
COMMUNITY

## 2022-09-14 RX ORDER — LISINOPRIL 30 MG/1
30 TABLET ORAL DAILY
Qty: 90 TABLET | Refills: 1 | Status: SHIPPED | OUTPATIENT
Start: 2022-09-14

## 2022-09-14 NOTE — PROGRESS NOTES
Barbara Rahman is a 69 y.o. female  Chief Complaint   Patient presents with    Headache     Visit Vitals  BP (!) 156/91 (BP 1 Location: Left upper arm, BP Patient Position: Sitting, BP Cuff Size: Large adult)   Pulse (!) 107   Temp 98.1 °F (36.7 °C) (Temporal)   Resp 16   Ht 5' 3\" (1.6 m)   Wt 255 lb 12.8 oz (116 kg)   SpO2 95%   BMI 45.31 kg/m²      Health Maintenance Due   Topic Date Due    Hepatitis C Screening  Never done    Shingrix Vaccine Age 49> (1 of 2) Never done    Colorectal Cancer Screening Combo  07/07/2015    Pneumococcal 65+ years (1 - PCV) 08/04/2018    Lipid Screen  02/22/2021    COVID-19 Vaccine (4 - Booster for Moderna series) 04/18/2022    DTaP/Tdap/Td series (2 - Td or Tdap) 06/18/2022    Flu Vaccine (1) 09/01/2022     HPI  R sided headaches in evenings x 1.5 months. Renovations at work started 1.5 months ago as well. Working 4 days per week. On days off still having headaches. 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 Had MRI brain last week - WNL per pt. BP with borderline control in Hx. High today and not improved at recheck:    BP Readings from Last 9 Encounters:   09/14/22 (!) 156/91   08/08/22 130/73   04/19/22 131/78   03/29/22 125/77   09/29/21 130/75   03/31/21 (!) 140/90   02/20/20 142/82   08/20/19 (!) 160/96   02/20/19 132/80     Currently taking:   Key CAD CHF Meds               rivaroxaban (Xarelto) 15 mg tab tablet Starting on 9/16/2022. Take 1 Tablet by mouth daily (with breakfast). lisinopriL (PRINIVIL, ZESTRIL) 20 mg tablet (Taking) Take 1 Tablet by mouth daily. simvastatin (ZOCOR) 20 mg tablet (Taking) Take 1 tablet by mouth nightly    chlorthalidone (HYGROTON) 25 mg tablet (Taking) TAKE ONE TABLET BY MOUTH EVERY DAY          ROS  Review of Systems   HENT:  Negative for congestion. Eyes:  Negative for blurred vision and double vision. Respiratory:  Negative for shortness of breath.     Cardiovascular:  Negative for chest pain and palpitations. Neurological:  Positive for headaches. Negative for dizziness, loss of consciousness and weakness. EXAM  Physical Exam  Vitals and nursing note reviewed. Constitutional:       General: She is not in acute distress. Appearance: She is well-developed. HENT:      Head: Normocephalic and atraumatic. Ears:      Comments: B TMs with fluid. B ear canals with mild excoriations     Nose: Congestion and rhinorrhea present. Comments: Pale, inflamed nasal mucosa  Eyes:      Conjunctiva/sclera: Conjunctivae normal.   Cardiovascular:      Rate and Rhythm: Normal rate and regular rhythm. Heart sounds: Normal heart sounds. Pulmonary:      Effort: Pulmonary effort is normal.      Breath sounds: Normal breath sounds. Musculoskeletal:      Cervical back: Normal range of motion and neck supple. Lymphadenopathy:      Cervical: No cervical adenopathy. Skin:     General: Skin is warm and dry. Neurological:      General: No focal deficit present. Mental Status: She is alert and oriented to person, place, and time. Mental status is at baseline. Gait: Gait normal.     ASSESSMENT/PLAN  Encounter Diagnoses     ICD-10-CM ICD-9-CM   1. Screening for eye condition  Z13.5 V80.2   2. Chronic nonintractable headache, unspecified headache type  R51.9 784.0    G89.29    3. Allergic rhinitis, unspecified seasonality, unspecified trigger  J30.9 477.9   4.  Essential hypertension  I10 401.9     Orders Placed This Encounter    REFERRAL TO OPHTHALMOLOGY    Add: cetirizine (ZYRTEC) 10 mg tablet    Increase dose to: lisinopriL (PRINIVIL, ZESTRIL) 30 mg tablet

## 2022-09-14 NOTE — PROGRESS NOTES
Rm    Chief Complaint   Patient presents with    Headache        Visit Vitals  BP (!) 156/91 (BP 1 Location: Left upper arm, BP Patient Position: Sitting, BP Cuff Size: Large adult)   Pulse (!) 107   Temp 98.1 °F (36.7 °C) (Temporal)   Resp 16   Ht 5' 3\" (1.6 m)   Wt 255 lb 12.8 oz (116 kg)   SpO2 95%   BMI 45.31 kg/m²        1. Have you been to the ER, urgent care clinic since your last visit? Hospitalized since your last visit? Yes 9/8/22 [facility]    2. Have you seen or consulted any other health care providers outside of the [address] [place] [address] since your last visit? Include any pap smears or colon screening. No     Health Maintenance Due   Topic Date Due    Hepatitis C Screening  Never done    Shingrix Vaccine Age 49> (1 of 2) Never done    Colorectal Cancer Screening Combo  07/07/2015    Pneumococcal 65+ years (1 - PCV) 08/04/2018    Lipid Screen  02/22/2021    COVID-19 Vaccine (4 - Booster for Moderna series) 04/18/2022    DTaP/Tdap/Td series (2 - Td or Tdap) 06/18/2022    Flu Vaccine (1) 09/01/2022        3 most recent PHQ Screens 9/14/2022   Little interest or pleasure in doing things Not at all   Feeling down, depressed, irritable, or hopeless Not at all   Total Score PHQ 2 0   Trouble falling or staying asleep, or sleeping too much -   Feeling tired or having little energy -   Poor appetite, weight loss, or overeating -   Feeling bad about yourself - or that you are a failure or have let yourself or your family down -   Trouble concentrating on things such as school, work, reading, or watching TV -   Moving or speaking so slowly that other people could have noticed; or the opposite being so fidgety that others notice -   Thoughts of being better off dead, or hurting yourself in some way -   PHQ 9 Score -   How difficult have these problems made it for you to do your work, take care of your home and get along with others -        Fall Risk Assessment, last 12 mths 9/14/2022   Able to walk?  Yes Fall in past 12 months? 0   Do you feel unsteady?  0   Are you worried about falling 0       Learning Assessment 3/31/2021   PRIMARY LEARNER Patient   HIGHEST LEVEL OF EDUCATION - PRIMARY LEARNER  SOME COLLEGE   BARRIERS PRIMARY LEARNER NONE   CO-LEARNER CAREGIVER No   PRIMARY LANGUAGE ENGLISH   LEARNER PREFERENCE PRIMARY READING     -     -     -   ANSWERED BY patient   RELATIONSHIP SELF

## 2022-09-27 ENCOUNTER — TELEPHONE (OUTPATIENT)
Dept: INTERNAL MEDICINE CLINIC | Age: 67
End: 2022-09-27

## 2022-09-27 NOTE — TELEPHONE ENCOUNTER
----- Message from [facility] sent at 9/26/2022  4:17 PM EDT -----  Subject: Appointment Request    Reason for Call: Established Patient Appointment needed: Routine (Patient   Request) No Script    QUESTIONS    Reason for appointment request? Available appointments did not meet   patient need     Additional Information for Provider? Noel Umaña the patient's son was   calling in to schedule his mother an appointment due to being exposed to   Zhen Christy and having COVID like symptoms. Available appointments does not meet   patient needs. Patient is needing to be seen sooner due to her trying to   et the antiviral medication prescribed. Patients son would like for her   provider to send the anti viral medication to her preferred pharmacy. She   would like it sent to [facility] [employer].  Please contact to further   assist.   ---------------------------------------------------------------------------  --------------  John Santillan [employer]  [phone]; OK to leave message on voicemail  ---------------------------------------------------------------------------  --------------  SCRIPT ANSWERS  COVID Screen: Leora Huntley

## 2022-10-05 ENCOUNTER — OFFICE VISIT (OUTPATIENT)
Dept: INTERNAL MEDICINE CLINIC | Age: 67
End: 2022-10-05
Payer: COMMERCIAL

## 2022-10-05 VITALS
OXYGEN SATURATION: 98 % | HEIGHT: 63 IN | SYSTOLIC BLOOD PRESSURE: 128 MMHG | BODY MASS INDEX: 44.12 KG/M2 | WEIGHT: 249 LBS | DIASTOLIC BLOOD PRESSURE: 64 MMHG | HEART RATE: 80 BPM | RESPIRATION RATE: 18 BRPM | TEMPERATURE: 98 F

## 2022-10-05 DIAGNOSIS — U07.1 COVID-19: ICD-10-CM

## 2022-10-05 DIAGNOSIS — E78.00 HYPERCHOLESTEROLEMIA: Primary | ICD-10-CM

## 2022-10-05 DIAGNOSIS — I10 ESSENTIAL HYPERTENSION: ICD-10-CM

## 2022-10-05 DIAGNOSIS — E78.00 HYPERCHOLESTEROLEMIA: ICD-10-CM

## 2022-10-05 PROCEDURE — 99213 OFFICE O/P EST LOW 20 MIN: CPT | Performed by: INTERNAL MEDICINE

## 2022-10-05 NOTE — PROGRESS NOTES
Had covid 8 days ago  taking sub therapeutic molpinavir using only 4 per day    Better now  tested neg today  BP was    Vitals:    10/05/22 1527   BP: 128/64   Pulse: 80   Resp: 18   Temp: 98 °F (36.7 °C)   TempSrc: Temporal   SpO2: 98%   Weight: 249 lb (112.9 kg)   Height: 5' 3\" (1.6 m)     Chest clear  Masked no cervical nodes    No fever  Looks OK    Advise finish antiviral  Hypertension controlled for age based on guidelines  Return work  at end of weak or Monday    Diagnoses and all orders for this visit:    1. Hypercholesterolemia  -     LIPID PANEL; Future    2. Essential hypertension  -     METABOLIC PANEL, COMPREHENSIVE; Future    3.  COVID-19

## 2022-10-05 NOTE — PROGRESS NOTES
Room:8  Identified pt with two pt identifiers(name and ). Reviewed record in preparation for visit and have obtained necessary documentation. Chief Complaint   Patient presents with    Follow-up    Hypertension        Vitals:    10/05/22 1527   BP: 128/64   Pulse: 80   Resp: 18   Temp: 98 °F (36.7 °C)   TempSrc: Temporal   SpO2: 98%   Weight: 249 lb (112.9 kg)   Height: 5' 3\" (1.6 m)   PainSc:   3   PainLoc: Head       Health Maintenance Due   Topic    Hepatitis C Screening     Shingrix Vaccine Age 50> (1 of 2)    Colorectal Cancer Screening Combo     Pneumococcal 65+ years (2 - PCV)    Lipid Screen     COVID-19 Vaccine (4 - Booster for Moderna series)    DTaP/Tdap/Td series (2 - Td or Tdap)    Flu Vaccine (1)       1. \"Have you been to the ER, urgent care clinic since your last visit? Hospitalized since your last visit? \" No    2. \"Have you seen or consulted any other health care providers outside of the [address] [place] [address] since your last visit? \" No     3. For patients over 45: Has the patient had a colonoscopy? Yes - Care Gap present. Most recent result on file     If the patient is female:    4. For patients over 40: Has the patient had a mammogram? Yes - no Care Gap present    5. For patients over 21: Has the patient had a pap smear? NA - based on age    Current Outpatient Medications   Medication Instructions    azelastine (ASTELIN) 137 mcg (0.1 %) nasal spray 1 Spray, Both Nostrils, 2 TIMES DAILY, Use in each nostril as directed    cetirizine (ZYRTEC) 10 mg, Oral, EVERY BEDTIME    chlorthalidone (HYGROTON) 25 mg tablet TAKE ONE TABLET BY MOUTH EVERY DAY    cholecalciferol (VITAMIN D3) 2,000 Units, Oral, DAILY    EnbreL SureClick 50 mg/mL (1 mL) injection No dose, route, or frequency recorded.     folic acid (FOLVITE) 3 mg, Oral, DAILY    leflunomide (ARAVA) 20 mg, Oral, DAILY    lisinopriL (PRINIVIL, ZESTRIL) 30 mg, Oral, DAILY    methotrexate (RHEUMATREX) 20 mg, Oral, EVERY     rivaroxaban (XARELTO) 15 mg, Oral, DAILY WITH BREAKFAST    simvastatin (ZOCOR) 20 mg tablet Take 1 tablet by mouth nightly       No Known Allergies    Immunization History   Administered Date(s) Administered    COVID-19, MODERNA BLUE border, Primary or Immunocompromised, (age 18y+), IM, 100 mcg/0.5mL 02/22/2021, 03/22/2021    COVID-19, MODERNA Booster BLUE border, (age 18y+), IM, 50mcg/0.25mL 12/18/2021    Influenza Vaccine 10/09/2014    Influenza Vaccine (Tri) Adjuvanted (>65 Yrs FLUAD TRI 43077) 02/20/2019    Influenza, FLUCELVAX, (age 10 mo+), MDCK, PF 09/26/2019, 09/27/2019    Pneumococcal Polysaccharide (PPSV-23) 11/12/2013, 12/12/2014    TB Skin Test (PPD) Intradermal 10/09/2014    TDAP Vaccine 06/18/2012       Past Medical History:   Diagnosis Date    Arthritis of knee 6/18/2012    HTN (hypertension) 6/18/2012    Hypercholesterolemia 6/18/2012    Obesity 6/18/2012    Rheumatoid arthritis(714.0) 4/21/2014

## 2022-10-06 LAB
ALBUMIN SERPL-MCNC: 3.3 G/DL (ref 3.5–5)
ALBUMIN/GLOB SERPL: 0.8 {RATIO} (ref 1.1–2.2)
ALP SERPL-CCNC: 89 U/L (ref 45–117)
ALT SERPL-CCNC: 12 U/L (ref 12–78)
ANION GAP SERPL CALC-SCNC: 8 MMOL/L (ref 5–15)
AST SERPL-CCNC: 20 U/L (ref 15–37)
BILIRUB SERPL-MCNC: 0.3 MG/DL (ref 0.2–1)
BUN SERPL-MCNC: 11 MG/DL (ref 6–20)
BUN/CREAT SERPL: 12 (ref 12–20)
CALCIUM SERPL-MCNC: 9.2 MG/DL (ref 8.5–10.1)
CHLORIDE SERPL-SCNC: 102 MMOL/L (ref 97–108)
CHOLEST SERPL-MCNC: 172 MG/DL
CO2 SERPL-SCNC: 28 MMOL/L (ref 21–32)
CREAT SERPL-MCNC: 0.91 MG/DL (ref 0.55–1.02)
GLOBULIN SER CALC-MCNC: 4.4 G/DL (ref 2–4)
GLUCOSE SERPL-MCNC: 101 MG/DL (ref 65–100)
HDLC SERPL-MCNC: 48 MG/DL
HDLC SERPL: 3.6 {RATIO} (ref 0–5)
LDLC SERPL CALC-MCNC: 100.6 MG/DL (ref 0–100)
POTASSIUM SERPL-SCNC: 4 MMOL/L (ref 3.5–5.1)
PROT SERPL-MCNC: 7.7 G/DL (ref 6.4–8.2)
SODIUM SERPL-SCNC: 138 MMOL/L (ref 136–145)
TRIGL SERPL-MCNC: 117 MG/DL (ref ?–150)
VLDLC SERPL CALC-MCNC: 23.4 MG/DL

## 2022-10-10 NOTE — PROGRESS NOTES
Called patient and verified name and , pt understands and Complies with provider instructions and directions. No further questions at this time.      Signed By: Meera Holcomb    October 10, 2022

## 2022-12-07 ENCOUNTER — OFFICE VISIT (OUTPATIENT)
Dept: INTERNAL MEDICINE CLINIC | Age: 67
End: 2022-12-07
Payer: COMMERCIAL

## 2022-12-07 VITALS
OXYGEN SATURATION: 96 % | TEMPERATURE: 96.8 F | SYSTOLIC BLOOD PRESSURE: 116 MMHG | HEIGHT: 63 IN | BODY MASS INDEX: 42.98 KG/M2 | WEIGHT: 242.6 LBS | RESPIRATION RATE: 18 BRPM | DIASTOLIC BLOOD PRESSURE: 78 MMHG | HEART RATE: 118 BPM

## 2022-12-07 DIAGNOSIS — M79.89 RIGHT LEG SWELLING: Primary | ICD-10-CM

## 2022-12-07 DIAGNOSIS — Z86.718 HISTORY OF DVT OF LOWER EXTREMITY: ICD-10-CM

## 2022-12-07 PROCEDURE — 99213 OFFICE O/P EST LOW 20 MIN: CPT | Performed by: INTERNAL MEDICINE

## 2022-12-07 RX ORDER — CYCLOBENZAPRINE HYDROCHLORIDE 7.5 MG/1
7.5 TABLET, FILM COATED ORAL
COMMUNITY

## 2022-12-07 NOTE — PROGRESS NOTES
Chief Complaint   Patient presents with    Leg Pain     Right leg pain (1x week) pulled a muscle in R arm as well           Vitals:    12/07/22 1417   BP: 116/78   Pulse: (!) 118   Resp: 18   Temp: 96.8 °F (36 °C)   TempSrc: Temporal   SpO2: 96%   Weight: 242 lb 9.6 oz (110 kg)   Height: 5' 3\" (1.6 m)   PainSc:   7   PainLoc: Leg       Current Outpatient Medications on File Prior to Visit   Medication Sig Dispense Refill    Cyclobenzaprine 7.5 mg tablet Take 7.5 mg by mouth three (3) times daily as needed for Muscle Spasm(s). cetirizine (ZYRTEC) 10 mg tablet Take 1 Tablet by mouth nightly. Indications: inflammation of the nose due to an allergy      lisinopriL (PRINIVIL, ZESTRIL) 30 mg tablet Take 1 Tablet by mouth daily. 90 Tablet 1    rivaroxaban (Xarelto) 15 mg tab tablet Take 1 Tablet by mouth daily (with breakfast). 30 Tablet 5    EnbreL SureClick 50 mg/mL (1 mL) injection       simvastatin (ZOCOR) 20 mg tablet Take 1 tablet by mouth nightly 90 Tablet 1    chlorthalidone (HYGROTON) 25 mg tablet TAKE ONE TABLET BY MOUTH EVERY DAY 90 Tablet 1    cholecalciferol (VITAMIN D3) (1000 Units /25 mcg) tablet Take 2,000 Units by mouth daily. leflunomide (ARAVA) 20 mg tablet Take 20 mg by mouth daily. methotrexate (RHEUMATREX) 2.5 mg tablet Take 20 mg by mouth every Sunday. azelastine (ASTELIN) 137 mcg (0.1 %) nasal spray 1 [place] by Both Nostrils route two (2) times a day. Use in each nostril as directed 1 Bottle 1    folic acid (FOLVITE) 1 mg tablet Take 3 mg by mouth daily. No current facility-administered medications on file prior to visit. Health Maintenance Due   Topic    Hepatitis C Screening     Shingrix Vaccine Age 50> (1 of 2)    Colorectal Cancer Screening Combo     Pneumococcal 65+ years (2 - PCV)    COVID-19 Vaccine (4 - Booster for Moderna series)    DTaP/Tdap/Td series (2 - Td or Tdap)    Flu Vaccine (1)       1.  \"Have you been to the ER, urgent care clinic since your last visit? Hospitalized since your last visit? \" No    2. \"Have you seen or consulted any other health care providers outside of the [address] [place] [address] since your last visit? \" No     3. For patients aged 39-70: Has the patient had a colonoscopy / FIT/ Cologuard? No      If the patient is female:    4. For patients aged 41-77: Has the patient had a mammogram within the past 2 years? Yes - no Care Gap present      5. For patients aged 21-65: Has the patient had a pap smear?  Yes - no Care Gap present

## 2022-12-07 NOTE — PROGRESS NOTES
Chief Complaint   Patient presents with    Leg Pain     Right leg pain (1x week) pulled a muscle in R arm as well      Long car ride on 11/27  with right leg spasm and pain since  HX DVT 9 years ago and PE later on 15 xarelto never misses takes with food  Went better med given muscle relaxer      Patient Active Problem List    Diagnosis    Chronic nonintractable headache, unspecified headache type    Allergic rhinitis, unspecified seasonality, unspecified trigger    History of pulmonary embolism     2 episode recent 3/22      Obesity, morbid (HCC)    Rheumatoid arthritis     Good improvement  With methotrexate   20 mg seeing DR José      Essential hypertension    Arthritis of knee     intrarticular cartilage 11/12      Hypercholesterolemia    Obesity     Vitals:    12/07/22 1417   BP: 116/78   Pulse: (!) 118   Resp: 18   Temp: 96.8 °F (36 °C)   TempSrc: Temporal   SpO2: 96%   Weight: 242 lb 9.6 oz (110 kg)   Height: 5' 3\" (1.6 m)     Right calf sl tender   Tender right lat thigh  Rom r knee ok      Diagnoses and all orders for this visit:    1. Right leg swelling  -     DUPLEX LOWER EXT VENOUS RIGHT; Future    2. History of DVT of lower extremity  -     DUPLEX LOWER EXT VENOUS RIGHT;  Future    Stat doppler ordered

## 2022-12-08 ENCOUNTER — HOSPITAL ENCOUNTER (OUTPATIENT)
Dept: VASCULAR SURGERY | Age: 67
Discharge: HOME | End: 2022-12-08
Attending: INTERNAL MEDICINE
Payer: COMMERCIAL

## 2022-12-08 DIAGNOSIS — M79.89 RIGHT LEG SWELLING: ICD-10-CM

## 2022-12-08 DIAGNOSIS — Z86.718 HISTORY OF DVT OF LOWER EXTREMITY: ICD-10-CM

## 2022-12-08 PROCEDURE — 93971 EXTREMITY STUDY: CPT

## 2022-12-12 ENCOUNTER — TELEPHONE (OUTPATIENT)
Dept: INTERNAL MEDICINE CLINIC | Age: 67
End: 2022-12-12

## 2022-12-22 LAB — CREATININE, EXTERNAL: 0.75

## 2022-12-31 DIAGNOSIS — E78.00 HYPERCHOLESTEROLEMIA: ICD-10-CM

## 2023-01-01 RX ORDER — SIMVASTATIN 20 MG/1
TABLET, FILM COATED ORAL
Qty: 90 TABLET | Refills: 0 | Status: SHIPPED | OUTPATIENT
Start: 2023-01-01

## 2023-03-15 ENCOUNTER — TELEPHONE (OUTPATIENT)
Dept: INTERNAL MEDICINE CLINIC | Age: 68
End: 2023-03-15

## 2023-03-27 ENCOUNTER — TELEPHONE (OUTPATIENT)
Dept: INTERNAL MEDICINE CLINIC | Age: 68
End: 2023-03-27

## 2023-03-27 NOTE — TELEPHONE ENCOUNTER
----- Message from [facility] [place] [facility] sent at 3/27/2023  4:00 PM EDT -----  Subject: Refill Request    QUESTIONS  Name of Medication? lisinopriL (PRINIVIL, ZESTRIL) 30 mg tablet  Patient-reported dosage and instructions? once daily  How many days do you have left? 3  [facility]? [facility]  Pharmacy phone number (if available)? [phone]  Additional Information for Provider? pt has to go out of town to check on   her sister, who had heart attack, she's asking for enough refill to get   her through til her f/u with dr Jane Schafer 4/27. please call pt  ---------------------------------------------------------------------------  --------------  CALL BACK INFO  What is the best way for the office to contact you? OK to leave message on   voicemail  Preferred Call Back Phone Number? [phone]  ---------------------------------------------------------------------------  --------------  SCRIPT ANSWERS  Relationship to Patient?  Self

## 2023-03-28 RX ORDER — LISINOPRIL 30 MG/1
30 TABLET ORAL DAILY
Qty: 90 TABLET | Refills: 1 | Status: SHIPPED | OUTPATIENT
Start: 2023-03-28

## 2023-04-27 ENCOUNTER — OFFICE VISIT (OUTPATIENT)
Dept: INTERNAL MEDICINE CLINIC | Age: 68
End: 2023-04-27

## 2023-04-27 VITALS
SYSTOLIC BLOOD PRESSURE: 130 MMHG | TEMPERATURE: 97.9 F | WEIGHT: 244.2 LBS | BODY MASS INDEX: 43.27 KG/M2 | HEART RATE: 82 BPM | DIASTOLIC BLOOD PRESSURE: 84 MMHG | HEIGHT: 63 IN | OXYGEN SATURATION: 98 % | RESPIRATION RATE: 17 BRPM

## 2023-04-27 DIAGNOSIS — I10 ESSENTIAL HYPERTENSION: ICD-10-CM

## 2023-04-27 DIAGNOSIS — E66.01 OBESITY, CLASS III, BMI 40-49.9 (MORBID OBESITY) (HCC): ICD-10-CM

## 2023-04-27 DIAGNOSIS — E78.00 HYPERCHOLESTEROLEMIA: ICD-10-CM

## 2023-04-27 DIAGNOSIS — Z12.31 BREAST CANCER SCREENING BY MAMMOGRAM: Primary | ICD-10-CM

## 2023-04-27 DIAGNOSIS — Z86.711 HISTORY OF PULMONARY EMBOLISM: ICD-10-CM

## 2023-04-27 DIAGNOSIS — M05.7A RHEUMATOID ARTHRITIS OF OTHER SITE WITH POSITIVE RHEUMATOID FACTOR (HCC): ICD-10-CM

## 2023-04-27 NOTE — PROGRESS NOTES
Rm    Chief Complaint   Patient presents with    Cholesterol Problem     6 month follow up        Visit Vitals  BP (!) 149/92 (BP 1 Location: Right upper arm, BP Patient Position: Sitting, BP Cuff Size: Large adult)   Pulse 82   Temp 97.9 °F (36.6 °C)   Resp 17   Ht 5' 3\" (1.6 m)   Wt 244 lb 3.2 oz (110.8 kg)   SpO2 98%   BMI 43.26 kg/m²        1. Have you been to the ER, urgent care clinic since your last visit? Hospitalized since your last visit? No    2. Have you seen or consulted any other health care providers outside of the [address] [place] [address] since your last visit? Include any pap smears or colon screening. No     Health Maintenance Due   Topic Date Due    Hepatitis C Screening  Never done    Shingles Vaccine (1 of 2) Never done    Colorectal Cancer Screening Combo  07/07/2015    Pneumococcal 65+ years (2 - PCV) 12/12/2015    COVID-19 Vaccine (4 - Booster for Moderna series) 02/12/2022    DTaP/Tdap/Td series (2 - Td or Tdap) 06/18/2022    Breast Cancer Screen Mammogram  04/27/2023        3 most recent PHQ Screens 4/27/2023   Little interest or pleasure in doing things Not at all   Feeling down, depressed, irritable, or hopeless Not at all   Total Score PHQ 2 0   Trouble falling or staying asleep, or sleeping too much -   Feeling tired or having little energy -   Poor appetite, weight loss, or overeating -   Feeling bad about yourself - or that you are a failure or have let yourself or your family down -   Trouble concentrating on things such as school, work, reading, or watching TV -   Moving or speaking so slowly that other people could have noticed; or the opposite being so fidgety that others notice -   Thoughts of being better off dead, or hurting yourself in some way -   PHQ 9 Score -   How difficult have these problems made it for you to do your work, take care of your home and get along with others -        Fall Risk Assessment, last 12 mths 4/27/2023   Able to walk?  Yes   Fall in past 15 months? 0   Do you feel unsteady?  0   Are you worried about falling 0       Learning Assessment 3/31/2021   PRIMARY LEARNER Patient   HIGHEST LEVEL OF EDUCATION - PRIMARY LEARNER  SOME COLLEGE   BARRIERS PRIMARY LEARNER NONE   CO-LEARNER CAREGIVER No   PRIMARY LANGUAGE ENGLISH   LEARNER PREFERENCE PRIMARY READING     -     -     -   ANSWERED BY patient   RELATIONSHIP SELF

## 2023-04-28 NOTE — PROGRESS NOTES
Xiang No is a 69 y.o. female with the following history as recorded in [facility]:  Patient Active Problem List    Diagnosis Date Noted    History of DVT of lower extremity 12/07/2022    Chronic nonintractable headache, unspecified headache type 09/14/2022    Allergic rhinitis, unspecified seasonality, unspecified trigger 09/14/2022    History of pulmonary embolism 03/29/2022    Obesity, morbid ([facility] 06/01/2018    Rheumatoid arthritis involving both wrists with positive rheumatoid factor ([facility] 04/21/2014    Essential hypertension 06/18/2012    Arthritis of knee 06/18/2012    Hypercholesterolemia 06/18/2012    Obesity 06/18/2012     Current Outpatient Medications   Medication Sig Dispense Refill    simvastatin (ZOCOR) 20 mg tablet Take 1 tablet by mouth nightly 90 Tablet 3    lisinopriL (PRINIVIL, ZESTRIL) 30 mg tablet Take 1 Tablet by mouth daily. 90 Tablet 1    Xarelto 15 mg tab tablet Take 1 tablet by mouth once daily with breakfast 30 Tablet 5    Cyclobenzaprine 7.5 mg tablet Take 7.5 mg by mouth three (3) times daily as needed for Muscle Spasm(s). cetirizine (ZYRTEC) 10 mg tablet Take 1 Tablet by mouth nightly. Indications: inflammation of the nose due to an allergy      EnbreL SureClick 50 mg/mL (1 mL) injection       chlorthalidone (HYGROTON) 25 mg tablet TAKE ONE TABLET BY MOUTH EVERY DAY 90 Tablet 1    cholecalciferol (VITAMIN D3) (1000 Units /25 mcg) tablet Take 2 Tablets by mouth daily. leflunomide (ARAVA) 20 mg tablet Take 1 Tablet by mouth daily. methotrexate (RHEUMATREX) 2.5 mg tablet Take 8 Tablets by mouth every Sunday. azelastine (ASTELIN) 137 mcg (0.1 %) nasal spray 1 [place] by Both Nostrils route two (2) times a day. Use in each nostril as directed 1 Bottle 1    folic acid (FOLVITE) 1 mg tablet Take 3 Tablets by mouth daily. Allergies: Patient has no known allergies.   Past Medical History:   Diagnosis Date    Arthritis of knee 6/18/2012    HTN (hypertension) 6/18/2012    Hypercholesterolemia 6/18/2012    Obesity 6/18/2012    Rheumatoid arthritis(714.0) 4/21/2014     Past Surgical History:   Procedure Laterality Date    HX GYN      BTL    HX ORTHOPAEDIC  12/11/14    left knee replacement     Family History   Problem Relation Age of Onset    Heart Disease Mother     Stroke Mother     Heart Disease Father     Cancer Father         prostate    Breast Cancer Paternal Aunt      Social History     Tobacco Use    Smoking status: Never    Smokeless tobacco: Never   Substance Use Topics    Alcohol use: No     She is doing pretty well now. Had a pulmonary embolism last summer. It has been about eight to nine months. She is tolerating the low dose anticoagulant Xarelto 15 mg daily with no recurrence. Had doppler in December due to some swelling. It was negative. Told her she may get a venous insufficiency and should use support stockings. She is continuing on rheumatoid arthritis treatment with Rheumatology, on high risk meds with Enbrel and methotrexate. She is hoping to get another knee replacement later this year. She is going to continue to work throughout the year, she hopes and may be retire early next year. Getting along okay, taking a statin. Lipids are well controlled. She is overdue on a few other screening testings. Her blood pressure was normal.  She was in no acute distress. Lungs were clear. S1, S2 was regular. Legs were not swollen. There was no varicosities, nor was big signs of venous insufficiency, but with her obesity intermittent leg swelling is not uncommon. Recommend continued weight loss, exercise to get improved strength on her legs so that when she does have a knee replacement, she will do better. She will continue with Xarelto. I told her this will probably be long term medicine and most recommendations will be to stay on long term anticoagulant and she understands that.   She will see me back in six months and we will get repeat labs and a lipid profile around that time. Vitals:    04/27/23 0754 04/27/23 0810   BP: (!) 149/92 130/84   Pulse: 82    Resp: 17    Temp: 97.9 °F (36.6 °C)    SpO2: 98%    Weight: 244 lb 3.2 oz (110.8 kg)    Height: 5' 3\" (1.6 m)      S1 and S2 normal, no murmurs, clicks, gallops or rubs. Regular rate and rhythm. Chest is clear; no wheezes or rales. No edema or JVD. 1. Rheumatoid arthritis of other site with positive rheumatoid factor ([facility]  controlled    2. Breast cancer screening by mammogram    - [facility] MAMMO BI SCREENING INCL CAD; Future    3. Obesity, Class III, BMI 40-49.9 (morbid obesity) ([facility]  reviewed    4. Essential hypertension  Hypertension controlled for age based on guidelines      5. Hypercholesterolemia  Lipid abnormality controlled  Lab Results   Component Value Date/Time    Cholesterol, total 172 10/05/2022 03:54 PM    HDL Cholesterol 48 10/05/2022 03:54 PM    LDL, calculated 100.6 (H) 10/05/2022 03:54 PM    VLDL, calculated 23.4 10/05/2022 03:54 PM    Triglyceride 117 10/05/2022 03:54 PM    CHOL/HDL Ratio 3.6 10/05/2022 03:54 PM         6.  History of pulmonary embolism  Long term anticoagulant

## 2023-05-17 ENCOUNTER — TRANSCRIBE ORDERS (OUTPATIENT)
Facility: HOSPITAL | Age: 68
End: 2023-05-17

## 2023-05-17 DIAGNOSIS — Z12.31 ENCOUNTER FOR MAMMOGRAM TO ESTABLISH BASELINE MAMMOGRAM: Primary | ICD-10-CM

## 2023-06-07 ENCOUNTER — HOSPITAL ENCOUNTER (OUTPATIENT)
Facility: HOSPITAL | Age: 68
Discharge: HOME | End: 2023-06-10
Attending: INTERNAL MEDICINE
Payer: COMMERCIAL

## 2023-06-07 DIAGNOSIS — Z12.31 ENCOUNTER FOR MAMMOGRAM TO ESTABLISH BASELINE MAMMOGRAM: ICD-10-CM

## 2023-06-07 PROCEDURE — 77063 BREAST TOMOSYNTHESIS BI: CPT

## 2023-06-20 ENCOUNTER — TELEPHONE (OUTPATIENT)
Age: 68
End: 2023-06-20

## 2023-06-21 ENCOUNTER — TELEPHONE (OUTPATIENT)
Age: 68
End: 2023-06-21

## 2023-06-21 NOTE — TELEPHONE ENCOUNTER
Called patient to let her know that she can stop taking blood thinner for three days for her dental procedure per PCP.      \"I called her off for 3 days \"       GREG

## 2023-07-03 RX ORDER — LISINOPRIL 30 MG/1
TABLET ORAL
Qty: 90 TABLET | Refills: 0 | Status: SHIPPED | OUTPATIENT
Start: 2023-07-03

## 2023-08-17 ENCOUNTER — TELEPHONE (OUTPATIENT)
Age: 68
End: 2023-08-17

## 2023-08-17 NOTE — TELEPHONE ENCOUNTER
----- Message from Albert Shelby sent at 8/17/2023  9:04 AM EDT -----  Subject: Hospital Follow Up    QUESTIONS  What hospital was the Patient Discharged from? [facility]  Date of Discharge? 2023-08-14  Discharge Location? Reason for hospitalization as patient stated? Vertigo   What question does the patient have, if applicable?   ---------------------------------------------------------------------------  --------------  CALL BACK INFO  What is the best way for the office to contact you? Do not leave any   message, patient will call back for answer  Preferred Call Back Phone Number? [phone]  ---------------------------------------------------------------------------  --------------  SCRIPT ANSWERS  Relationship to Patient?  Self

## 2023-08-21 ENCOUNTER — OFFICE VISIT (OUTPATIENT)
Age: 68
End: 2023-08-21
Payer: COMMERCIAL

## 2023-08-21 VITALS
TEMPERATURE: 97 F | BODY MASS INDEX: 43.59 KG/M2 | WEIGHT: 246 LBS | SYSTOLIC BLOOD PRESSURE: 120 MMHG | HEIGHT: 63 IN | RESPIRATION RATE: 20 BRPM | DIASTOLIC BLOOD PRESSURE: 70 MMHG | OXYGEN SATURATION: 96 % | HEART RATE: 90 BPM

## 2023-08-21 DIAGNOSIS — Z09 HOSPITAL DISCHARGE FOLLOW-UP: Primary | ICD-10-CM

## 2023-08-21 DIAGNOSIS — R42 DIZZINESS: ICD-10-CM

## 2023-08-21 PROCEDURE — 1111F DSCHRG MED/CURRENT MED MERGE: CPT | Performed by: INTERNAL MEDICINE

## 2023-08-21 PROCEDURE — 99213 OFFICE O/P EST LOW 20 MIN: CPT | Performed by: INTERNAL MEDICINE

## 2023-08-21 PROCEDURE — 3074F SYST BP LT 130 MM HG: CPT | Performed by: INTERNAL MEDICINE

## 2023-08-21 PROCEDURE — 3078F DIAST BP <80 MM HG: CPT | Performed by: INTERNAL MEDICINE

## 2023-08-21 PROCEDURE — 1123F ACP DISCUSS/DSCN MKR DOCD: CPT | Performed by: INTERNAL MEDICINE

## 2023-08-21 RX ORDER — PREDNISONE 5 MG/1
TABLET ORAL
COMMUNITY
Start: 2023-08-15

## 2023-08-21 RX ORDER — B-COMPLEX WITH VITAMIN C
1 TABLET ORAL DAILY
COMMUNITY

## 2023-08-21 RX ORDER — MECLIZINE HYDROCHLORIDE 25 MG/1
TABLET ORAL
COMMUNITY
Start: 2023-08-14

## 2023-08-21 SDOH — ECONOMIC STABILITY: HOUSING INSECURITY
IN THE LAST 12 MONTHS, WAS THERE A TIME WHEN YOU DID NOT HAVE A STEADY PLACE TO SLEEP OR SLEPT IN A SHELTER (INCLUDING NOW)?: NO

## 2023-08-21 SDOH — ECONOMIC STABILITY: FOOD INSECURITY: WITHIN THE PAST 12 MONTHS, THE FOOD YOU BOUGHT JUST DIDN'T LAST AND YOU DIDN'T HAVE MONEY TO GET MORE.: NEVER TRUE

## 2023-08-21 SDOH — ECONOMIC STABILITY: INCOME INSECURITY: HOW HARD IS IT FOR YOU TO PAY FOR THE VERY BASICS LIKE FOOD, HOUSING, MEDICAL CARE, AND HEATING?: NOT HARD AT ALL

## 2023-08-21 SDOH — ECONOMIC STABILITY: FOOD INSECURITY: WITHIN THE PAST 12 MONTHS, YOU WORRIED THAT YOUR FOOD WOULD RUN OUT BEFORE YOU GOT MONEY TO BUY MORE.: NEVER TRUE

## 2023-08-21 NOTE — PROGRESS NOTES
Post-Discharge Transitional Care Management Progress Note      Breann Salmeron   YOB: 1955    Date of Office Visit:  8/21/2023  Date of Hospital Admission: 8/12/23  Date of Hospital Discharge: 8/14/23    Care management risk score Rising risk (score 2-5) and Complex Care (Scores >=6): No Risk Score On File     Non face to face  following discharge, date last encounter closed (first attempt may have been earlier): *No documented post hospital discharge outreach found in the last 14 days *No documented post hospital discharge outreach found in the last 14 days    Call initiated 2 business days of discharge: *No response recorded in the last 14 days    ASSESSMENT/PLAN:   Hospital discharge follow-up  -     [place] DISCHARGE MEDS RECONCILED W/ CURRENT OUTPATIENT MED LIST  Dizziness    Medical Decision Making: low complexity  Return if symptoms worsen or fail to improve. Subjective:   HPI:  Follow up of Hospital problems/diagnosis(es): BPPV  Ms. Jostin Lee is a 77 y.o. who has a history of HTN, HLD, RA , PE on xarelto, meningioma s/p resection who presents to clinic after hospitalization at [facility] for short stay. She had CT head, sinus xray and routine labs were normal. She was started on meclizine that helps with vertigo. She knows the epley maneuvers and she has Neurosurgery follow up for meningioma resection history in upcomming weeks. No change in medication except the meclizine     Inpatient course: Discharge summary reviewed- see chart.     Interval history/Current status:     Patient Active Problem List   Diagnosis    Hypercholesterolemia    Arthritis of knee    Obesity    Obesity, morbid ([address])    History of pulmonary embolism    Chronic nonintractable headache, unspecified headache type    Allergic rhinitis, unspecified seasonality, unspecified trigger    Essential hypertension    History of DVT of lower extremity    Rheumatoid arthritis involving both wrists with positive rheumatoid factor

## 2023-08-21 NOTE — PROGRESS NOTES
Patient is here following up from hospital visit. 1. \"Have you been to the ER, urgent care clinic since your last visit? Hospitalized since your last visit? \" Yes Alec 8/12 for vertigo    2. \"Have you seen or consulted any other health care providers outside of the [address] [place] [address] since your last visit? \" No     3. For patients aged 43-73: Has the patient had a colonoscopy / FIT/ Cologuard? Yes - Care Gap present. Rooming MA/LPN to request most recent results      If the patient is female:    4. For patients aged 43-66: Has the patient had a mammogram within the past 2 years? Yes - no Care Gap present      5. For patients aged 21-65: Has the patient had a pap smear?  No

## 2023-10-13 NOTE — TELEPHONE ENCOUNTER
Refill request received from [facility] for   Requested Prescriptions     Pending Prescriptions Disp Refills    XARELTO 15 MG TABS tablet [Pharmacy Med Name: Xarelto 15 MG Oral Tablet] 30 tablet 0     Sig: Take 1 tablet by mouth once daily with breakfast     Last appointment: 8/21/2023   Next appointment: 10/13/2023     Routed to Dr Carmen Cowan for review.

## 2023-10-15 RX ORDER — RIVAROXABAN 15 MG/1
15 TABLET, FILM COATED ORAL
Qty: 30 TABLET | Refills: 0 | Status: SHIPPED | OUTPATIENT
Start: 2023-10-15

## 2023-10-31 ENCOUNTER — OFFICE VISIT (OUTPATIENT)
Age: 68
End: 2023-10-31
Payer: COMMERCIAL

## 2023-10-31 VITALS
DIASTOLIC BLOOD PRESSURE: 80 MMHG | TEMPERATURE: 97.6 F | HEART RATE: 95 BPM | OXYGEN SATURATION: 96 % | SYSTOLIC BLOOD PRESSURE: 140 MMHG | WEIGHT: 249 LBS | BODY MASS INDEX: 44.12 KG/M2 | HEIGHT: 63 IN

## 2023-10-31 DIAGNOSIS — I10 ESSENTIAL (PRIMARY) HYPERTENSION: ICD-10-CM

## 2023-10-31 DIAGNOSIS — Z86.718 HISTORY OF DVT OF LOWER EXTREMITY: ICD-10-CM

## 2023-10-31 DIAGNOSIS — Z12.11 COLON CANCER SCREENING: ICD-10-CM

## 2023-10-31 DIAGNOSIS — E78.00 PURE HYPERCHOLESTEROLEMIA, UNSPECIFIED: ICD-10-CM

## 2023-10-31 DIAGNOSIS — H81.13 BENIGN PAROXYSMAL POSITIONAL VERTIGO DUE TO BILATERAL VESTIBULAR DISORDER: Primary | ICD-10-CM

## 2023-10-31 PROCEDURE — 1123F ACP DISCUSS/DSCN MKR DOCD: CPT | Performed by: INTERNAL MEDICINE

## 2023-10-31 PROCEDURE — 99214 OFFICE O/P EST MOD 30 MIN: CPT | Performed by: INTERNAL MEDICINE

## 2023-10-31 PROCEDURE — 3074F SYST BP LT 130 MM HG: CPT | Performed by: INTERNAL MEDICINE

## 2023-10-31 PROCEDURE — 3078F DIAST BP <80 MM HG: CPT | Performed by: INTERNAL MEDICINE

## 2023-10-31 RX ORDER — LISINOPRIL 30 MG/1
30 TABLET ORAL DAILY
Qty: 90 TABLET | Refills: 1 | Status: SHIPPED | OUTPATIENT
Start: 2023-10-31

## 2023-10-31 ASSESSMENT — PATIENT HEALTH QUESTIONNAIRE - PHQ9
SUM OF ALL RESPONSES TO PHQ QUESTIONS 1-9: 0
SUM OF ALL RESPONSES TO PHQ9 QUESTIONS 1 & 2: 0
SUM OF ALL RESPONSES TO PHQ QUESTIONS 1-9: 0
2. FEELING DOWN, DEPRESSED OR HOPELESS: 0
1. LITTLE INTEREST OR PLEASURE IN DOING THINGS: 0

## 2023-11-01 LAB
ALBUMIN SERPL-MCNC: 3.4 G/DL (ref 3.5–5)
ALBUMIN/GLOB SERPL: 0.8 (ref 1.1–2.2)
ALP SERPL-CCNC: 84 U/L (ref 45–117)
ALT SERPL-CCNC: 16 U/L (ref 12–78)
ANION GAP SERPL CALC-SCNC: 8 MMOL/L (ref 5–15)
AST SERPL-CCNC: 17 U/L (ref 15–37)
BASOPHILS # BLD: 0.1 K/UL (ref 0–0.1)
BASOPHILS NFR BLD: 1 % (ref 0–1)
BILIRUB SERPL-MCNC: 0.3 MG/DL (ref 0.2–1)
BUN SERPL-MCNC: 12 MG/DL (ref 6–20)
BUN/CREAT SERPL: 13 (ref 12–20)
CALCIUM SERPL-MCNC: 9.4 MG/DL (ref 8.5–10.1)
CHLORIDE SERPL-SCNC: 104 MMOL/L (ref 97–108)
CHOLEST SERPL-MCNC: 194 MG/DL
CO2 SERPL-SCNC: 29 MMOL/L (ref 21–32)
CREAT SERPL-MCNC: 0.95 MG/DL (ref 0.55–1.02)
DIFFERENTIAL METHOD BLD: ABNORMAL
EOSINOPHIL # BLD: 0.2 K/UL (ref 0–0.4)
EOSINOPHIL NFR BLD: 2 % (ref 0–7)
ERYTHROCYTE [DISTWIDTH] IN BLOOD BY AUTOMATED COUNT: 14.3 % (ref 11.5–14.5)
GLOBULIN SER CALC-MCNC: 4.3 G/DL (ref 2–4)
GLUCOSE SERPL-MCNC: 96 MG/DL (ref 65–100)
HCT VFR BLD AUTO: 39.8 % (ref 35–47)
HDLC SERPL-MCNC: 72 MG/DL
HDLC SERPL: 2.7 (ref 0–5)
HGB BLD-MCNC: 12.4 G/DL (ref 11.5–16)
IMM GRANULOCYTES # BLD AUTO: 0.1 K/UL (ref 0–0.04)
IMM GRANULOCYTES NFR BLD AUTO: 1 % (ref 0–0.5)
LDLC SERPL CALC-MCNC: 91 MG/DL (ref 0–100)
LYMPHOCYTES # BLD: 3.6 K/UL (ref 0.8–3.5)
LYMPHOCYTES NFR BLD: 35 % (ref 12–49)
MCH RBC QN AUTO: 29 PG (ref 26–34)
MCHC RBC AUTO-ENTMCNC: 31.2 G/DL (ref 30–36.5)
MCV RBC AUTO: 93.2 FL (ref 80–99)
MONOCYTES # BLD: 0.9 K/UL (ref 0–1)
MONOCYTES NFR BLD: 9 % (ref 5–13)
NEUTS SEG # BLD: 5.5 K/UL (ref 1.8–8)
NEUTS SEG NFR BLD: 52 % (ref 32–75)
NRBC # BLD: 0 K/UL (ref 0–0.01)
NRBC BLD-RTO: 0 PER 100 WBC
PLATELET # BLD AUTO: 382 K/UL (ref 150–400)
PMV BLD AUTO: 9.3 FL (ref 8.9–12.9)
POTASSIUM SERPL-SCNC: 4.2 MMOL/L (ref 3.5–5.1)
PROT SERPL-MCNC: 7.7 G/DL (ref 6.4–8.2)
RBC # BLD AUTO: 4.27 M/UL (ref 3.8–5.2)
SODIUM SERPL-SCNC: 141 MMOL/L (ref 136–145)
TRIGL SERPL-MCNC: 155 MG/DL
VLDLC SERPL CALC-MCNC: 31 MG/DL
WBC # BLD AUTO: 10.4 K/UL (ref 3.6–11)

## 2023-11-01 NOTE — PROGRESS NOTES
Matthew Zapata is a 77 y.o. female with the following history as recorded in [facility]:  Patient Active Problem List    Diagnosis Date Noted    History of DVT of lower extremity 12/07/2022    Chronic nonintractable headache, unspecified headache type 09/14/2022    Allergic rhinitis, unspecified seasonality, unspecified trigger 09/14/2022    History of pulmonary embolism 03/29/2022    Obesity, morbid ([address]) 06/01/2018    Rheumatoid arthritis involving both wrists with positive rheumatoid factor ([address]) 04/21/2014    Hypercholesterolemia 06/18/2012    Arthritis of knee 06/18/2012    Obesity 06/18/2012    Essential hypertension 06/18/2012     Current Outpatient Medications   Medication Sig Dispense Refill    lisinopril (PRINIVIL;ZESTRIL) 30 MG tablet Take 1 tablet by mouth daily 90 tablet 1    rivaroxaban (XARELTO) 15 MG TABS tablet Take 1 tablet by mouth daily (with breakfast) 90 tablet 1    meclizine (ANTIVERT) 25 MG tablet TAKE 1 TABLET BY MOUTH 3 TIMES A DAY AS NEEDED FOR DIZZINESS      Calcium Carbonate-Vitamin D (OYSTER SHELL CALCIUM/D) 500-5 MG-MCG TABS Take 1 tablet by mouth daily      predniSONE (DELTASONE) 5 MG tablet TAKE 4 TABLETS BY MOUTH ONCE DAILY FOR 2 DAYS, THEN 3 TABLETS ONCE DAILY FOR 2 DAYS, THEN 2 TABLETS ONCE DAILY FOR 2 DAYS, THEN 1 TABLET ONCE DAILY FOR 2 DAYS      azelastine (ASTELIN) 0.1 % nasal spray 1 spray by Nasal route 2 times daily      cetirizine (ZYRTEC) 10 MG tablet Take by mouth      chlorthalidone (HYGROTON) 25 MG tablet TAKE ONE TABLET BY MOUTH EVERY DAY      vitamin D (CHOLECALCIFEROL) 25 MCG (1000 UT) TABS tablet Take by mouth daily      Etanercept (ENBREL SURECLICK) 50 MG/ML SOAJ ceived the following from [facility]: Outside name: EnbreL SureClick 50 mg/mL (1 mL) injection      folic acid (FOLVITE) 1 MG tablet Take by mouth daily      methotrexate (RHEUMATREX) 2.5 MG chemo tablet Take by mouth      simvastatin (ZOCOR) 20 MG tablet Take 1 tablet by mouth nightly

## 2023-12-10 LAB — NONINV COLON CA DNA+OCC BLD SCRN STL QL: NEGATIVE

## 2024-04-30 ENCOUNTER — OFFICE VISIT (OUTPATIENT)
Age: 69
End: 2024-04-30
Payer: COMMERCIAL

## 2024-04-30 VITALS
OXYGEN SATURATION: 97 % | HEIGHT: 63 IN | SYSTOLIC BLOOD PRESSURE: 125 MMHG | RESPIRATION RATE: 14 BRPM | WEIGHT: 255.4 LBS | DIASTOLIC BLOOD PRESSURE: 89 MMHG | HEART RATE: 91 BPM | TEMPERATURE: 97 F | BODY MASS INDEX: 45.25 KG/M2

## 2024-04-30 DIAGNOSIS — R05.8 COUGH DUE TO ACE INHIBITOR: ICD-10-CM

## 2024-04-30 DIAGNOSIS — R53.83 OTHER FATIGUE: ICD-10-CM

## 2024-04-30 DIAGNOSIS — R73.03 PRE-DIABETES: ICD-10-CM

## 2024-04-30 DIAGNOSIS — M05.7A RHEUMATOID ARTHRITIS WITH RHEUMATOID FACTOR OF OTHER SPECIFIED SITE WITHOUT ORGAN OR SYSTEMS INVOLVEMENT (HCC): ICD-10-CM

## 2024-04-30 DIAGNOSIS — I10 ESSENTIAL (PRIMARY) HYPERTENSION: Primary | ICD-10-CM

## 2024-04-30 DIAGNOSIS — E66.09 OBESITY DUE TO EXCESS CALORIES WITH SERIOUS COMORBIDITY, UNSPECIFIED CLASSIFICATION: ICD-10-CM

## 2024-04-30 DIAGNOSIS — T46.4X5A COUGH DUE TO ACE INHIBITOR: ICD-10-CM

## 2024-04-30 DIAGNOSIS — I10 ESSENTIAL (PRIMARY) HYPERTENSION: ICD-10-CM

## 2024-04-30 LAB
ANION GAP SERPL CALC-SCNC: 4 MMOL/L (ref 5–15)
BUN SERPL-MCNC: 17 MG/DL (ref 6–20)
BUN/CREAT SERPL: 19 (ref 12–20)
CALCIUM SERPL-MCNC: 9.9 MG/DL (ref 8.5–10.1)
CHLORIDE SERPL-SCNC: 103 MMOL/L (ref 97–108)
CHOLEST SERPL-MCNC: 186 MG/DL
CO2 SERPL-SCNC: 29 MMOL/L (ref 21–32)
CREAT SERPL-MCNC: 0.9 MG/DL (ref 0.55–1.02)
GLUCOSE SERPL-MCNC: 105 MG/DL (ref 65–100)
HDLC SERPL-MCNC: 69 MG/DL
HDLC SERPL: 2.7 (ref 0–5)
LDLC SERPL CALC-MCNC: 99.2 MG/DL (ref 0–100)
POTASSIUM SERPL-SCNC: 4.4 MMOL/L (ref 3.5–5.1)
SODIUM SERPL-SCNC: 136 MMOL/L (ref 136–145)
TRIGL SERPL-MCNC: 89 MG/DL
TSH SERPL DL<=0.05 MIU/L-ACNC: 1.28 UIU/ML (ref 0.36–3.74)
URATE SERPL-MCNC: 6.7 MG/DL (ref 2.6–6)
VLDLC SERPL CALC-MCNC: 17.8 MG/DL

## 2024-04-30 PROCEDURE — 1123F ACP DISCUSS/DSCN MKR DOCD: CPT | Performed by: INTERNAL MEDICINE

## 2024-04-30 PROCEDURE — 99214 OFFICE O/P EST MOD 30 MIN: CPT | Performed by: INTERNAL MEDICINE

## 2024-04-30 PROCEDURE — 3079F DIAST BP 80-89 MM HG: CPT | Performed by: INTERNAL MEDICINE

## 2024-04-30 PROCEDURE — 3074F SYST BP LT 130 MM HG: CPT | Performed by: INTERNAL MEDICINE

## 2024-04-30 RX ORDER — ALBUTEROL SULFATE 90 UG/1
2 AEROSOL, METERED RESPIRATORY (INHALATION) EVERY 6 HOURS PRN
COMMUNITY

## 2024-04-30 RX ORDER — OLMESARTAN MEDOXOMIL 40 MG/1
40 TABLET ORAL DAILY
Qty: 90 TABLET | Refills: 1 | Status: SHIPPED | OUTPATIENT
Start: 2024-04-30

## 2024-04-30 ASSESSMENT — PATIENT HEALTH QUESTIONNAIRE - PHQ9
SUM OF ALL RESPONSES TO PHQ9 QUESTIONS 1 & 2: 0
SUM OF ALL RESPONSES TO PHQ QUESTIONS 1-9: 0
SUM OF ALL RESPONSES TO PHQ QUESTIONS 1-9: 0
2. FEELING DOWN, DEPRESSED OR HOPELESS: NOT AT ALL
SUM OF ALL RESPONSES TO PHQ QUESTIONS 1-9: 0
SUM OF ALL RESPONSES TO PHQ QUESTIONS 1-9: 0
1. LITTLE INTEREST OR PLEASURE IN DOING THINGS: NOT AT ALL

## 2024-04-30 NOTE — PROGRESS NOTES
Seen er for coughj wheeze  recently      Cora Bethea is a 68 y.o. female with the following history as recorded in [facility]:  Patient Active Problem List    Diagnosis Date Noted    Cough due to ACE inhibitor 04/30/2024    History of DVT of lower extremity 12/07/2022    Chronic nonintractable headache, unspecified headache type 09/14/2022    Allergic rhinitis, unspecified seasonality, unspecified trigger 09/14/2022    History of pulmonary embolism 03/29/2022    Obesity, morbid ([facility]) 06/01/2018    Rheumatoid arthritis involving both wrists with positive rheumatoid factor ([facility]) 04/21/2014    Hypercholesterolemia 06/18/2012    Arthritis of knee 06/18/2012    Obesity 06/18/2012    Essential hypertension 06/18/2012     Current Outpatient Medications   Medication Sig Dispense Refill    albuterol sulfate HFA (VENTOLIN HFA) 108 (90 Base) MCG/ACT inhaler Inhale 2 puffs into the lungs every 6 hours as needed for Wheezing      olmesartan (BENICAR) 40 MG tablet Take 1 tablet by mouth daily 90 tablet 1    rivaroxaban (XARELTO) 15 MG TABS tablet Take 1 tablet by mouth daily (with breakfast) 90 tablet 1    Calcium Carbonate-Vitamin D (OYSTER SHELL CALCIUM/D) 500-5 MG-MCG TABS Take 1 tablet by mouth daily      predniSONE (DELTASONE) 5 MG tablet Take 1 tablet by mouth as needed      azelastine (ASTELIN) 0.1 % nasal spray 1 spray by Nasal route 2 times daily      chlorthalidone (HYGROTON) 25 MG tablet TAKE ONE TABLET BY MOUTH EVERY DAY      vitamin D (CHOLECALCIFEROL) 25 MCG (1000 UT) TABS tablet Take by mouth daily      Etanercept (ENBREL SURECLICK) 50 MG/ML SOAJ ceived the following from [facility]: Outside name: EnbreL SureClick 50 mg/mL (1 mL) injection      folic acid (FOLVITE) 1 MG tablet Take by mouth daily      methotrexate (RHEUMATREX) 2.5 MG chemo tablet Take by mouth      simvastatin (ZOCOR) 20 MG tablet Take 1 tablet by mouth nightly      meclizine (ANTIVERT) 25 MG tablet TAKE 1 TABLET BY MOUTH 3

## 2024-06-05 DIAGNOSIS — Z86.718 HISTORY OF DVT OF LOWER EXTREMITY: ICD-10-CM

## 2024-06-05 RX ORDER — RIVAROXABAN 15 MG/1
15 TABLET, FILM COATED ORAL
Qty: 90 TABLET | Refills: 1 | Status: SHIPPED | OUTPATIENT
Start: 2024-06-05

## 2024-06-17 RX ORDER — SIMVASTATIN 20 MG
TABLET ORAL
Qty: 90 TABLET | Refills: 3 | Status: SHIPPED | OUTPATIENT
Start: 2024-06-17

## 2024-06-17 NOTE — TELEPHONE ENCOUNTER
Refill request received from [facility]    for   Requested Prescriptions     Pending Prescriptions Disp Refills    simvastatin (ZOCOR) 20 MG tablet [Pharmacy Med Name: Simvastatin 20 MG Oral Tablet] 90 tablet 0     Sig: Take 1 tablet by mouth nightly     Last office visit: 4/30/2024   Next office visit: 10/31/2024     Routed to Dr Elliot Alva for review.     Sarah Posadas LPN

## 2024-07-02 ENCOUNTER — TELEPHONE (OUTPATIENT)
Age: 69
End: 2024-07-02

## 2024-07-02 NOTE — TELEPHONE ENCOUNTER
Called to inform pt that appt 10/31 is cancelled due to provider not being in office for appt. Left a VM for pt to call back to reschedule for another day.

## 2024-08-26 ENCOUNTER — TELEPHONE (OUTPATIENT)
Age: 69
End: 2024-08-26

## 2024-08-26 NOTE — TELEPHONE ENCOUNTER
----- Message from Mindy CRESPO sent at 8/26/2024  8:51 AM EDT -----  Regarding: [facility] Appointment Request  [facility] Appointment Request    Patient needs appointment for [facility] Appointment Type: Existing Condition Follow Up.    Patient Requested Dates(s):5th of November  Patient Requested Time:anytime  Provider Name:   Elliot Alva MD        Reason for Appointment Request: Established Patient - No appointments available during search the patient has appointment on 10/31 but the practice that she needs to reschedule due to provider will be out of the office.Please call back the patient for further assistance. Thank you.    --------------------------------------------------------------------------------------------------------------------------    Relationship to Patient: Self     Call Back Information: OK to leave message on voicemail  Preferred Call Back Number: Phone [phone]

## 2024-09-18 RX ORDER — OLMESARTAN MEDOXOMIL 40 MG/1
40 TABLET ORAL DAILY
Qty: 90 TABLET | Refills: 1 | Status: SHIPPED | OUTPATIENT
Start: 2024-09-18

## 2024-09-20 ENCOUNTER — TELEPHONE (OUTPATIENT)
Age: 69
End: 2024-09-20

## 2024-10-29 ENCOUNTER — OFFICE VISIT (OUTPATIENT)
Age: 69
End: 2024-10-29

## 2024-10-29 VITALS
HEIGHT: 63 IN | OXYGEN SATURATION: 97 % | TEMPERATURE: 97.7 F | SYSTOLIC BLOOD PRESSURE: 140 MMHG | BODY MASS INDEX: 45.68 KG/M2 | DIASTOLIC BLOOD PRESSURE: 90 MMHG | WEIGHT: 257.8 LBS | RESPIRATION RATE: 18 BRPM | HEART RATE: 89 BPM

## 2024-10-29 DIAGNOSIS — E78.00 PURE HYPERCHOLESTEROLEMIA, UNSPECIFIED: ICD-10-CM

## 2024-10-29 DIAGNOSIS — I10 ESSENTIAL HYPERTENSION: ICD-10-CM

## 2024-10-29 DIAGNOSIS — Z86.718 HISTORY OF DVT OF LOWER EXTREMITY: ICD-10-CM

## 2024-10-29 DIAGNOSIS — Z23 NEEDS FLU SHOT: Primary | ICD-10-CM

## 2024-10-29 RX ORDER — OLMESARTAN MEDOXOMIL 40 MG/1
40 TABLET ORAL DAILY
Qty: 90 TABLET | Refills: 1 | Status: SHIPPED | OUTPATIENT
Start: 2024-10-29

## 2024-10-29 RX ORDER — CHLORTHALIDONE 25 MG/1
25 TABLET ORAL DAILY
Qty: 90 TABLET | Refills: 3 | Status: SHIPPED | OUTPATIENT
Start: 2024-10-29

## 2024-10-29 RX ORDER — AMLODIPINE BESYLATE 2.5 MG/1
2.5 TABLET ORAL DAILY
Qty: 90 TABLET | Refills: 1 | Status: SHIPPED | OUTPATIENT
Start: 2024-10-29

## 2024-10-29 SDOH — ECONOMIC STABILITY: FOOD INSECURITY: WITHIN THE PAST 12 MONTHS, THE FOOD YOU BOUGHT JUST DIDN'T LAST AND YOU DIDN'T HAVE MONEY TO GET MORE.: NEVER TRUE

## 2024-10-29 SDOH — ECONOMIC STABILITY: FOOD INSECURITY: WITHIN THE PAST 12 MONTHS, YOU WORRIED THAT YOUR FOOD WOULD RUN OUT BEFORE YOU GOT MONEY TO BUY MORE.: NEVER TRUE

## 2024-10-29 SDOH — ECONOMIC STABILITY: INCOME INSECURITY: HOW HARD IS IT FOR YOU TO PAY FOR THE VERY BASICS LIKE FOOD, HOUSING, MEDICAL CARE, AND HEATING?: NOT HARD AT ALL

## 2024-10-29 NOTE — PROGRESS NOTES
I have reviewed all needed documentation in preparation for visit. Verified patient by name and date of birth  Chief Complaint   Patient presents with    6 Month Follow-Up       Vitals:    10/29/24 1515   BP: 137/88   Site: Left Upper Arm   Position: Sitting   Cuff Size: Large Adult   Pulse: 89   Resp: 18   Temp: 97.7 °F (36.5 °C)   TempSrc: Temporal   SpO2: 97%   Weight: 116.9 kg (257 lb 12.8 oz)   Height: 1.6 m (5' 3\")       Health Maintenance Due   Topic Date Due    Hepatitis C screen  Never done    Shingles vaccine (1 of 2) Never done    Respiratory Syncytial Virus (RSV) Pregnant or age 60 yrs+ (1 - 1-dose 60+ series) Never done    Pneumococcal 65+ years Vaccine (2 of 2 - PCV) 08/04/2018    DTaP/Tdap/Td vaccine (2 - Td or Tdap) 06/18/2022    Flu vaccine (1) 08/01/2024    COVID-19 Vaccine (4 - 2023-24 season) 09/01/2024     \"Have you been to the ER, urgent care clinic since your last visit?  Hospitalized since your last visit?\"    Yes. 08.29.24 [facility]     “Have you seen or consulted any other health care providers outside of [facility] since your last visit?”    NO            Click Here for Release of Records Request         MARILIN Rolon

## 2024-10-29 NOTE — PROGRESS NOTES
Chief Complaint   Patient presents with    6 Month Follow-Up     Cora Bethea is a 69 y.o. female with the following history as recorded in [facility]:  Patient Active Problem List    Diagnosis Date Noted    Cough due to ACE inhibitor 04/30/2024    History of DVT of lower extremity 12/07/2022    Chronic nonintractable headache, unspecified headache type 09/14/2022    Allergic rhinitis, unspecified seasonality, unspecified trigger 09/14/2022    History of pulmonary embolism 03/29/2022    Obesity, morbid 06/01/2018    Rheumatoid arthritis involving both wrists with positive rheumatoid factor (HCC) 04/21/2014    Hypercholesterolemia 06/18/2012    Arthritis of knee 06/18/2012    Obesity 06/18/2012    Essential hypertension 06/18/2012     Current Outpatient Medications   Medication Sig Dispense Refill    adalimumab (HUMIRA) 40 MG/0.8ML injection Inject 0.8 mLs into the skin once      amLODIPine (NORVASC) 2.5 MG tablet Take 1 tablet by mouth daily 90 tablet 1    olmesartan (BENICAR) 40 MG tablet Take 1 tablet by mouth daily 90 tablet 1    chlorthalidone (HYGROTON) 25 MG tablet Take 1 tablet by mouth daily 90 tablet 3    simvastatin (ZOCOR) 20 MG tablet Take 1 tablet by mouth nightly 90 tablet 3    rivaroxaban (XARELTO) 15 MG TABS tablet Take 1 tablet by mouth once daily with breakfast 90 tablet 1    albuterol sulfate HFA (VENTOLIN HFA) 108 (90 Base) MCG/ACT inhaler Inhale 2 puffs into the lungs every 6 hours as needed for Wheezing      meclizine (ANTIVERT) 25 MG tablet       Calcium Carbonate-Vitamin D (OYSTER SHELL CALCIUM/D) 500-5 MG-MCG TABS Take 1 tablet by mouth daily      predniSONE (DELTASONE) 5 MG tablet Take 1 tablet by mouth as needed      azelastine (ASTELIN) 0.1 % nasal spray 1 spray by Nasal route 2 times daily      vitamin D (CHOLECALCIFEROL) 25 MCG (1000 UT) TABS tablet Take by mouth daily      folic acid (FOLVITE) 1 MG tablet Take by mouth daily      methotrexate (RHEUMATREX) 2.5 MG chemo tablet Take by

## 2024-12-17 DIAGNOSIS — Z86.718 HISTORY OF DVT OF LOWER EXTREMITY: ICD-10-CM

## 2024-12-18 RX ORDER — RIVAROXABAN 15 MG/1
15 TABLET, FILM COATED ORAL
Qty: 90 TABLET | Refills: 0 | Status: SHIPPED | OUTPATIENT
Start: 2024-12-18

## 2025-01-30 ENCOUNTER — OFFICE VISIT (OUTPATIENT)
Age: 70
End: 2025-01-30
Payer: COMMERCIAL

## 2025-01-30 VITALS
SYSTOLIC BLOOD PRESSURE: 120 MMHG | DIASTOLIC BLOOD PRESSURE: 78 MMHG | HEART RATE: 91 BPM | WEIGHT: 263.2 LBS | TEMPERATURE: 97.2 F | OXYGEN SATURATION: 98 % | BODY MASS INDEX: 46.62 KG/M2 | RESPIRATION RATE: 18 BRPM

## 2025-01-30 DIAGNOSIS — I10 ESSENTIAL HYPERTENSION: Primary | ICD-10-CM

## 2025-01-30 DIAGNOSIS — M05.7A RHEUMATOID ARTHRITIS WITH RHEUMATOID FACTOR OF OTHER SPECIFIED SITE WITHOUT ORGAN OR SYSTEMS INVOLVEMENT (HCC): ICD-10-CM

## 2025-01-30 DIAGNOSIS — S29.9XXA RIB INJURY: ICD-10-CM

## 2025-01-30 PROCEDURE — 1123F ACP DISCUSS/DSCN MKR DOCD: CPT | Performed by: INTERNAL MEDICINE

## 2025-01-30 PROCEDURE — 3074F SYST BP LT 130 MM HG: CPT | Performed by: INTERNAL MEDICINE

## 2025-01-30 PROCEDURE — 3078F DIAST BP <80 MM HG: CPT | Performed by: INTERNAL MEDICINE

## 2025-01-30 PROCEDURE — 99213 OFFICE O/P EST LOW 20 MIN: CPT | Performed by: INTERNAL MEDICINE

## 2025-01-30 SDOH — ECONOMIC STABILITY: FOOD INSECURITY: WITHIN THE PAST 12 MONTHS, YOU WORRIED THAT YOUR FOOD WOULD RUN OUT BEFORE YOU GOT MONEY TO BUY MORE.: NEVER TRUE

## 2025-01-30 SDOH — ECONOMIC STABILITY: FOOD INSECURITY: WITHIN THE PAST 12 MONTHS, THE FOOD YOU BOUGHT JUST DIDN'T LAST AND YOU DIDN'T HAVE MONEY TO GET MORE.: NEVER TRUE

## 2025-01-30 ASSESSMENT — PATIENT HEALTH QUESTIONNAIRE - PHQ9
SUM OF ALL RESPONSES TO PHQ QUESTIONS 1-9: 0
SUM OF ALL RESPONSES TO PHQ QUESTIONS 1-9: 0
SUM OF ALL RESPONSES TO PHQ9 QUESTIONS 1 & 2: 0
1. LITTLE INTEREST OR PLEASURE IN DOING THINGS: NOT AT ALL
SUM OF ALL RESPONSES TO PHQ QUESTIONS 1-9: 0
SUM OF ALL RESPONSES TO PHQ QUESTIONS 1-9: 0
2. FEELING DOWN, DEPRESSED OR HOPELESS: NOT AT ALL

## 2025-01-30 NOTE — PROGRESS NOTES
.Subjective:   Cora Bethea is a 69 y.o. female with hypertension.  Current Outpatient Medications   Medication Sig Dispense Refill    XARELTO 15 MG TABS tablet Take 1 tablet by mouth once daily with breakfast 90 tablet 0    adalimumab (HUMIRA) 40 MG/0.8ML injection Inject 0.8 mLs into the skin once      amLODIPine (NORVASC) 2.5 MG tablet Take 1 tablet by mouth daily 90 tablet 1    olmesartan (BENICAR) 40 MG tablet Take 1 tablet by mouth daily 90 tablet 1    chlorthalidone (HYGROTON) 25 MG tablet Take 1 tablet by mouth daily 90 tablet 3    simvastatin (ZOCOR) 20 MG tablet Take 1 tablet by mouth nightly 90 tablet 3    albuterol sulfate HFA (VENTOLIN HFA) 108 (90 Base) MCG/ACT inhaler Inhale 2 puffs into the lungs every 6 hours as needed for Wheezing      meclizine (ANTIVERT) 25 MG tablet       Calcium Carbonate-Vitamin D (OYSTER SHELL CALCIUM/D) 500-5 MG-MCG TABS Take 1 tablet by mouth daily      predniSONE (DELTASONE) 5 MG tablet Take 1 tablet by mouth as needed      vitamin D (CHOLECALCIFEROL) 25 MCG (1000 UT) TABS tablet Take by mouth daily      folic acid (FOLVITE) 1 MG tablet Take by mouth daily      methotrexate (RHEUMATREX) 2.5 MG chemo tablet Take by mouth       No current facility-administered medications for this visit.      Hypertension ROS: taking medications as instructed, no medication side effects noted, home BP monitoring in range of 120 to 130's systolic over 80's diastolic, no TIA's, no chest pain on exertion, no dyspnea on exertion, no swelling of ankles.   New concerns: rib tender right from hitting it. yesterday    Objective:   /78 (Site: Left Lower Arm, Position: Sitting, Cuff Size: Medium Adult)   Pulse 91   Temp 97.2 °F (36.2 °C) (Temporal)   Resp 18   Wt 119.4 kg (263 lb 3.2 oz)   SpO2 98%   BMI 46.62 kg/m²    Appearance alert, well appearing, and in no distress and overweight.  General exam BP noted to be well controlled today in office, S1, S2 normal, no gallop, no murmur,

## 2025-01-30 NOTE — PROGRESS NOTES
I have reviewed all needed documentation in preparation for visit. Verified patient by name and date of birth    Chief Complaint   Patient presents with    3 Month Follow-Up       \"Have you been to the ER, urgent care clinic since your last visit?  Hospitalized since your last visit?\"    NO    “Have you seen or consulted any other health care providers outside our system since your last visit?”    NO             Vitals:    01/30/25 0908   BP: 120/78   Site: Left Lower Arm   Position: Sitting   Cuff Size: Medium Adult   Pulse: 91   Resp: 18   Temp: 97.2 °F (36.2 °C)   TempSrc: Temporal   SpO2: 98%   Weight: 119.4 kg (263 lb 3.2 oz)       Health Maintenance Due   Topic Date Due    Hepatitis C screen  Never done    Shingles vaccine (1 of 2) Never done    Respiratory Syncytial Virus (RSV) Pregnant or age 60 yrs+ (1 - Risk 60-74 years 1-dose series) Never done    Pneumococcal 65+ years Vaccine (2 of 2 - PCV) 08/04/2018    DTaP/Tdap/Td vaccine (2 - Td or Tdap) 06/18/2022    COVID-19 Vaccine (4 - 2023-24 season) 09/01/2024       Sarah Posadas LPN

## 2025-03-18 DIAGNOSIS — Z86.718 HISTORY OF DVT OF LOWER EXTREMITY: ICD-10-CM

## 2025-03-20 RX ORDER — RIVAROXABAN 15 MG/1
15 TABLET, FILM COATED ORAL
Qty: 90 TABLET | Refills: 1 | Status: SHIPPED | OUTPATIENT
Start: 2025-03-20

## 2025-06-17 RX ORDER — SIMVASTATIN 20 MG
20 TABLET ORAL NIGHTLY
Qty: 90 TABLET | Refills: 3 | Status: SHIPPED | OUTPATIENT
Start: 2025-06-17

## 2025-06-17 NOTE — TELEPHONE ENCOUNTER
Refill request received from [facility] for   Requested Prescriptions     Pending Prescriptions Disp Refills    simvastatin (ZOCOR) 20 MG tablet 90 tablet 3     Sig: Take 1 tablet by mouth nightly     Last office visit: 1/30/2025   Next office visit: 7/17/2025     Routed to Dr Suly Valdes for review.         Emma Rubio Cma